# Patient Record
Sex: MALE | Race: BLACK OR AFRICAN AMERICAN | Employment: FULL TIME | URBAN - METROPOLITAN AREA
[De-identification: names, ages, dates, MRNs, and addresses within clinical notes are randomized per-mention and may not be internally consistent; named-entity substitution may affect disease eponyms.]

---

## 2022-01-02 ENCOUNTER — HOSPITAL ENCOUNTER (EMERGENCY)
Age: 67
Discharge: HOME | End: 2022-01-02
Attending: EMERGENCY MEDICINE
Payer: MEDICARE

## 2022-01-02 ENCOUNTER — APPOINTMENT (OUTPATIENT)
Dept: GENERAL RADIOLOGY | Age: 67
End: 2022-01-02
Attending: EMERGENCY MEDICINE
Payer: MEDICARE

## 2022-01-02 VITALS
WEIGHT: 315 LBS | DIASTOLIC BLOOD PRESSURE: 90 MMHG | OXYGEN SATURATION: 98 % | BODY MASS INDEX: 47.74 KG/M2 | SYSTOLIC BLOOD PRESSURE: 156 MMHG | RESPIRATION RATE: 20 BRPM | HEIGHT: 68 IN | TEMPERATURE: 98 F | HEART RATE: 78 BPM

## 2022-01-02 DIAGNOSIS — J18.9 COMMUNITY ACQUIRED PNEUMONIA, UNSPECIFIED LATERALITY: Primary | ICD-10-CM

## 2022-01-02 LAB
COVID-19 RAPID TEST, COVR: NOT DETECTED
FLUAV AG NPH QL IA: NEGATIVE
FLUBV AG NPH QL IA: NEGATIVE
GLUCOSE BLD STRIP.AUTO-MCNC: 142 MG/DL (ref 65–100)
SERVICE CMNT-IMP: ABNORMAL
SOURCE, COVRS: NORMAL
SPECIMEN SOURCE: NORMAL

## 2022-01-02 PROCEDURE — 87804 INFLUENZA ASSAY W/OPTIC: CPT

## 2022-01-02 PROCEDURE — 82962 GLUCOSE BLOOD TEST: CPT

## 2022-01-02 PROCEDURE — 71046 X-RAY EXAM CHEST 2 VIEWS: CPT

## 2022-01-02 PROCEDURE — 99283 EMERGENCY DEPT VISIT LOW MDM: CPT

## 2022-01-02 PROCEDURE — 87635 SARS-COV-2 COVID-19 AMP PRB: CPT

## 2022-01-02 RX ORDER — CEFPODOXIME PROXETIL 100 MG/1
200 TABLET, FILM COATED ORAL 2 TIMES DAILY
Qty: 28 TABLET | Refills: 0 | Status: SHIPPED | OUTPATIENT
Start: 2022-01-02 | End: 2022-01-09

## 2022-01-02 RX ORDER — BENZONATATE 100 MG/1
100 CAPSULE ORAL
Qty: 21 CAPSULE | Refills: 0 | Status: SHIPPED | OUTPATIENT
Start: 2022-01-02 | End: 2022-01-09

## 2022-01-02 RX ORDER — DOXYCYCLINE HYCLATE 100 MG
100 TABLET ORAL 2 TIMES DAILY
Qty: 14 TABLET | Refills: 0 | Status: SHIPPED | OUTPATIENT
Start: 2022-01-02 | End: 2022-01-09

## 2022-01-03 NOTE — ED NOTES
I have reviewed discharge instructions with the patient. The patient verbalized understanding. Patient left ED via Discharge Method: ambulatory to Home with self. Opportunity for questions and clarification provided. Patient given 3 scripts. To continue your aftercare when you leave the hospital, you may receive an automated call from our care team to check in on how you are doing. This is a free service and part of our promise to provide the best care and service to meet your aftercare needs.  If you have questions, or wish to unsubscribe from this service please call [phone]. Thank you for Choosing our [facility] Emergency Department.

## 2022-01-03 NOTE — ED PROVIDER NOTES
71-year-old male presents with complaint of obesity, diabetes who presents with complaint of congestion, productive cough with yellow sputum over the past several days. Denies chest pain, hemoptysis, abdominal pain, lower extremity swelling or calf tenderness. Patient uncertain of any recent sick contacts. Rate symptoms as moderate. Denies fever, chills. The history is provided by the patient. No  was used. Cough  This is a new problem. The current episode started more than 2 days ago. The problem occurs constantly. The problem has not changed since onset. The cough is productive of sputum. There has been no fever. Associated symptoms include rhinorrhea. Pertinent negatives include no chest pain, no chills, no sweats, no weight loss, no ear congestion, no ear pain, no headaches, no sore throat, no myalgias, no shortness of breath, no wheezing, no nausea, no vomiting and no confusion. He has tried nothing for the symptoms. The treatment provided no relief. No past medical history on file. No past surgical history on file. No family history on file.     Social History     Socioeconomic History    Marital status: SINGLE     Spouse name: Not on file    Number of children: Not on file    Years of education: Not on file    Highest education level: Not on file   Occupational History    Not on file   Tobacco Use    Smoking status: Not on file    Smokeless tobacco: Not on file   Substance and Sexual Activity    Alcohol use: Not on file    Drug use: Not on file    Sexual activity: Not on file   Other Topics Concern    Not on file   Social History Narrative    Not on file     Social Determinants of Health     Financial Resource Strain:     Difficulty of Paying Living Expenses: Not on file   Food Insecurity:     Worried About Running Out of Food in the Last Year: Not on file    Ly of Food in the Last Year: Not on file   Transportation Needs:     Lack of Transportation (Medical): Not on file    Lack of Transportation (Non-Medical): Not on file   Physical Activity:     Days of Exercise per Week: Not on file    Minutes of Exercise per Session: Not on file   Stress:     Feeling of Stress : Not on file   Social Connections:     Frequency of Communication with Friends and Family: Not on file    Frequency of Social Gatherings with Friends and Family: Not on file    Attends [employer] Services: Not on file    Active Member of [address] [place] [address] or Organizations: Not on file    Attends Club or Organization Meetings: Not on file    Marital Status: Not on file   Intimate Partner Violence:     Fear of Current or Ex-Partner: Not on file    Emotionally Abused: Not on file    Physically Abused: Not on file    Sexually Abused: Not on file   Housing Stability:     Unable to Pay for Housing in the Last Year: Not on file    Number of [place] in the Last Year: Not on file    Unstable Housing in the Last Year: Not on file         ALLERGIES: Sulfa (sulfonamide antibiotics)    Review of Systems   Constitutional: Negative for chills, fatigue, fever and weight loss. HENT: Positive for congestion and rhinorrhea. Negative for ear pain and sore throat. Respiratory: Positive for cough. Negative for shortness of breath and wheezing. Cardiovascular: Negative for chest pain. Gastrointestinal: Negative for abdominal pain, diarrhea, nausea and vomiting. Genitourinary: Negative for dysuria and flank pain. Musculoskeletal: Negative for arthralgias and myalgias. Skin: Negative for rash. Neurological: Negative for syncope, weakness, light-headedness and headaches. Hematological: Does not bruise/bleed easily. Psychiatric/Behavioral: Negative for confusion.        Vitals:    01/02/22 1829   BP: (!) 156/90   Pulse: 78   Resp: 20   Temp: 98 °F (36.7 °C)   SpO2: 98%   Weight: (!) 179.6 kg (396 lb)   Height: 5' 8\" (1.727 m)            Physical Exam  Vitals and nursing note reviewed. Constitutional:       Appearance: Normal appearance. He is obese. Comments: Well-appearing. HENT:      Head: Normocephalic. Right Ear: Tympanic membrane and ear canal normal.      Left Ear: Ear canal normal.      Nose: Nose normal.      Mouth/Throat:      Mouth: Mucous membranes are moist.   Eyes:      Conjunctiva/sclera: Conjunctivae normal.      Pupils: Pupils are equal, round, and reactive to light. Cardiovascular:      Rate and Rhythm: Normal rate. Pulses: Normal pulses. Heart sounds: Normal heart sounds. Pulmonary:      Effort: Pulmonary effort is normal.      Breath sounds: Normal breath sounds. Comments: CTAB. Abdominal:      General: Bowel sounds are normal.      Palpations: Abdomen is soft. Tenderness: There is no abdominal tenderness. There is no guarding or rebound. Comments: Soft, nontender, nondistended. No CVAT    Musculoskeletal:         General: No swelling. Normal range of motion. Right lower leg: No edema. Left lower leg: No edema. Comments: No calf tenderness. Skin:     Findings: No erythema or rash. Neurological:      General: No focal deficit present. Mental Status: He is alert and oriented to person, place, and time. Cranial Nerves: No cranial nerve deficit. Motor: No weakness. MDM  Number of Diagnoses or Management Options  Community acquired pneumonia, unspecified laterality: new and requires workup  Diagnosis management comments: Rapid Covid negative. Flu negative. Chest x-ray with pneumonia. Will discharge home with doxycycline, Vantin. Will discharge home with his own personal pulse oximeter. Patient given return precautions.        Amount and/or Complexity of Data Reviewed  Clinical lab tests: ordered and reviewed  Tests in the radiology section of CPT®: ordered and reviewed  Review and summarize past medical records: yes  Independent visualization of images, tracings, or specimens: yes    Risk of Complications, Morbidity, and/or Mortality  Presenting problems: moderate  Diagnostic procedures: low  Management options: low    Patient Progress  Patient progress: stable    ED Course as of 01/02/22 1927   Latisha Moore Jan 02, 2022 1926 CXR  IMPRESSION     Increased density overlying the lower thoracic spinal lateral view, without  definite correlate on the PA view. This may be an early pneumonia. Correlation  with patient's symptoms recommended.    [DF]      ED Course User Index  [DF] Chrissie Ward MD       Procedures               Torrey Ko MD; 1/2/2022 @8:02 PM Voice dictation software was used during the making of this note. This software is not perfect and grammatical and other typographical errors may be present.   This note has not been proofread for errors.  ===================================================================

## 2022-01-03 NOTE — DISCHARGE INSTRUCTIONS
Take antibiotics as prescribed. Schedule close follow-up primary care physician. Return to ED if symptoms worsen or progress in any way.

## 2022-05-13 ENCOUNTER — HOSPITAL ENCOUNTER (EMERGENCY)
Age: 67
Discharge: HOME | End: 2022-05-13
Attending: EMERGENCY MEDICINE
Payer: MEDICARE

## 2022-05-13 ENCOUNTER — APPOINTMENT (OUTPATIENT)
Dept: GENERAL RADIOLOGY | Age: 67
End: 2022-05-13
Attending: EMERGENCY MEDICINE
Payer: MEDICARE

## 2022-05-13 VITALS
TEMPERATURE: 98.2 F | OXYGEN SATURATION: 94 % | HEART RATE: 71 BPM | DIASTOLIC BLOOD PRESSURE: 81 MMHG | SYSTOLIC BLOOD PRESSURE: 149 MMHG | RESPIRATION RATE: 19 BRPM

## 2022-05-13 DIAGNOSIS — J18.9 COMMUNITY ACQUIRED PNEUMONIA, UNSPECIFIED LATERALITY: Primary | ICD-10-CM

## 2022-05-13 DIAGNOSIS — R05.9 COUGH: ICD-10-CM

## 2022-05-13 LAB
ALBUMIN SERPL-MCNC: 3.1 G/DL (ref 3.2–4.6)
ALBUMIN/GLOB SERPL: 0.6 {RATIO} (ref 1.2–3.5)
ALP SERPL-CCNC: 71 U/L (ref 50–136)
ALT SERPL-CCNC: 29 U/L (ref 12–65)
ANION GAP SERPL CALC-SCNC: 11 MMOL/L (ref 7–16)
AST SERPL-CCNC: 27 U/L (ref 15–37)
BASOPHILS # BLD: 0.1 K/UL (ref 0–0.2)
BASOPHILS NFR BLD: 1 % (ref 0–2)
BILIRUB SERPL-MCNC: 0.3 MG/DL (ref 0.2–1.1)
BUN SERPL-MCNC: 17 MG/DL (ref 8–23)
CALCIUM SERPL-MCNC: 8.7 MG/DL (ref 8.3–10.4)
CHLORIDE SERPL-SCNC: 107 MMOL/L (ref 98–107)
CO2 SERPL-SCNC: 23 MMOL/L (ref 21–32)
COVID-19 RAPID TEST, COVR: NOT DETECTED
CREAT SERPL-MCNC: 1.56 MG/DL (ref 0.8–1.5)
DIFFERENTIAL METHOD BLD: ABNORMAL
EOSINOPHIL # BLD: 0.2 K/UL (ref 0–0.8)
EOSINOPHIL NFR BLD: 3 % (ref 0.5–7.8)
ERYTHROCYTE [DISTWIDTH] IN BLOOD BY AUTOMATED COUNT: 17 % (ref 11.9–14.6)
FLUAV AG NPH QL IA: NEGATIVE
FLUBV AG NPH QL IA: NEGATIVE
GLOBULIN SER CALC-MCNC: 5.2 G/DL (ref 2.3–3.5)
GLUCOSE SERPL-MCNC: 92 MG/DL (ref 65–100)
HCT VFR BLD AUTO: 42.3 % (ref 41.1–50.3)
HGB BLD-MCNC: 13.3 G/DL (ref 13.6–17.2)
IMM GRANULOCYTES # BLD AUTO: 0 K/UL (ref 0–0.5)
IMM GRANULOCYTES NFR BLD AUTO: 0 % (ref 0–5)
LYMPHOCYTES # BLD: 2 K/UL (ref 0.5–4.6)
LYMPHOCYTES NFR BLD: 31 % (ref 13–44)
MCH RBC QN AUTO: 26.7 PG (ref 26.1–32.9)
MCHC RBC AUTO-ENTMCNC: 31.4 G/DL (ref 31.4–35)
MCV RBC AUTO: 84.8 FL (ref 79.6–97.8)
MONOCYTES # BLD: 0.9 K/UL (ref 0.1–1.3)
MONOCYTES NFR BLD: 14 % (ref 4–12)
NEUTS SEG # BLD: 3.3 K/UL (ref 1.7–8.2)
NEUTS SEG NFR BLD: 51 % (ref 43–78)
NRBC # BLD: 0 K/UL (ref 0–0.2)
PLATELET # BLD AUTO: 211 K/UL (ref 150–450)
PMV BLD AUTO: 9.5 FL (ref 9.4–12.3)
POTASSIUM SERPL-SCNC: 4.2 MMOL/L (ref 3.5–5.1)
PROT SERPL-MCNC: 8.3 G/DL (ref 6.3–8.2)
RBC # BLD AUTO: 4.99 M/UL (ref 4.23–5.6)
SODIUM SERPL-SCNC: 141 MMOL/L (ref 136–145)
SOURCE, COVRS: NORMAL
SPECIMEN SOURCE: NORMAL
WBC # BLD AUTO: 6.5 K/UL (ref 4.3–11.1)

## 2022-05-13 PROCEDURE — 96372 THER/PROPH/DIAG INJ SC/IM: CPT

## 2022-05-13 PROCEDURE — 99284 EMERGENCY DEPT VISIT MOD MDM: CPT

## 2022-05-13 PROCEDURE — 87804 INFLUENZA ASSAY W/OPTIC: CPT

## 2022-05-13 PROCEDURE — 74011000250 HC RX REV CODE- 250: Performed by: PHYSICIAN ASSISTANT

## 2022-05-13 PROCEDURE — 87635 SARS-COV-2 COVID-19 AMP PRB: CPT

## 2022-05-13 PROCEDURE — 74011250636 HC RX REV CODE- 250/636: Performed by: PHYSICIAN ASSISTANT

## 2022-05-13 PROCEDURE — 85025 COMPLETE CBC W/AUTO DIFF WBC: CPT

## 2022-05-13 PROCEDURE — 80053 COMPREHEN METABOLIC PANEL: CPT

## 2022-05-13 PROCEDURE — 73590 X-RAY EXAM OF LOWER LEG: CPT

## 2022-05-13 PROCEDURE — 71046 X-RAY EXAM CHEST 2 VIEWS: CPT

## 2022-05-13 RX ORDER — MODAFINIL 100 MG/1
100 TABLET ORAL DAILY
COMMUNITY
Start: 2022-03-09

## 2022-05-13 RX ORDER — ATORVASTATIN CALCIUM 80 MG/1
80 TABLET, FILM COATED ORAL DAILY
COMMUNITY
Start: 2022-04-01

## 2022-05-13 RX ORDER — LEVOFLOXACIN 750 MG/1
750 TABLET ORAL DAILY
Qty: 7 TABLET | Refills: 0 | Status: SHIPPED | OUTPATIENT
Start: 2022-05-13 | End: 2022-05-13 | Stop reason: SDUPTHER

## 2022-05-13 RX ORDER — LANOLIN ALCOHOL/MO/W.PET/CERES
325 CREAM (GRAM) TOPICAL DAILY
COMMUNITY
Start: 2022-04-01

## 2022-05-13 RX ORDER — FLUTICASONE PROPIONATE 50 MCG
2 SPRAY, SUSPENSION (ML) NASAL 2 TIMES DAILY
COMMUNITY
Start: 2022-04-01

## 2022-05-13 RX ORDER — TAMSULOSIN HYDROCHLORIDE 0.4 MG/1
0.8 CAPSULE ORAL DAILY
COMMUNITY
Start: 2022-04-01

## 2022-05-13 RX ORDER — DICLOFENAC SODIUM 10 MG/G
1-2 GEL TOPICAL 3 TIMES DAILY
COMMUNITY
Start: 2022-04-01

## 2022-05-13 RX ORDER — LEVOFLOXACIN 750 MG/1
750 TABLET ORAL DAILY
Qty: 7 TABLET | Refills: 0 | Status: SHIPPED | OUTPATIENT
Start: 2022-05-13 | End: 2022-05-20

## 2022-05-13 RX ORDER — METFORMIN HYDROCHLORIDE 500 MG/1
1000 TABLET, EXTENDED RELEASE ORAL 2 TIMES DAILY
COMMUNITY
Start: 2022-04-01

## 2022-05-13 RX ORDER — ALBUTEROL SULFATE 90 UG/1
2 AEROSOL, METERED RESPIRATORY (INHALATION)
COMMUNITY
Start: 2022-04-01

## 2022-05-13 RX ORDER — LANOLIN ALCOHOL/MO/W.PET/CERES
1000 CREAM (GRAM) TOPICAL DAILY
COMMUNITY
Start: 2022-04-01

## 2022-05-13 RX ORDER — ACETAMINOPHEN 500 MG
2000 TABLET ORAL DAILY
COMMUNITY
Start: 2022-04-01

## 2022-05-13 RX ORDER — ESOMEPRAZOLE MAGNESIUM 40 MG/1
40 CAPSULE, DELAYED RELEASE ORAL 2 TIMES DAILY
COMMUNITY
Start: 2022-04-01

## 2022-05-13 RX ORDER — HYDROXYZINE HYDROCHLORIDE 10 MG/1
10 TABLET, FILM COATED ORAL DAILY
COMMUNITY
Start: 2022-04-01

## 2022-05-13 RX ORDER — HYDROCORTISONE VALERATE 2 MG/G
CREAM TOPICAL 2 TIMES DAILY
COMMUNITY
Start: 2022-04-01

## 2022-05-13 RX ORDER — SACUBITRIL AND VALSARTAN 97; 103 MG/1; MG/1
1 TABLET, FILM COATED ORAL 2 TIMES DAILY
COMMUNITY
Start: 2022-04-01

## 2022-05-13 RX ADMIN — CEFTRIAXONE SODIUM 1000 MG: 500 INJECTION, POWDER, FOR SOLUTION INTRAMUSCULAR; INTRAVENOUS at 20:19

## 2022-05-13 NOTE — ED PROVIDER NOTES
Mask was worn during the entire patient examination. Mitul Aldrich is a 73 y.o. male who presents to the ED with a chief complaint of upper respiratory symptoms and right leg pain. Upper respiratory symptoms have been ongoing for over a week now. He has tried mucinex and other OTC agents with minimal relief. States the cough has slowly worsened. Denies chest pain, SOB, abdominal pain, fever, nausea, vomiting. Additionally has lateral right lower leg pain. Started several days ago. Atraumatic. Reports \"soreness\". No calf pain, numbness, or tingling. No history of DVT or PE. The history is provided by the patient. Cold Symptoms  This is a new problem. The current episode started more than 1 week ago. The problem occurs constantly. The problem has been gradually worsening. Pertinent negatives include no chest pain, no abdominal pain, no headaches and no shortness of breath. Nothing aggravates the symptoms. Nothing relieves the symptoms. He has tried nothing for the symptoms. Leg Pain  This is a new problem. The current episode started more than 2 days ago. The problem occurs constantly. The problem has not changed since onset. Pertinent negatives include no chest pain, no abdominal pain, no headaches and no shortness of breath. Nothing aggravates the symptoms. Nothing relieves the symptoms. He has tried nothing for the symptoms. No past medical history on file. No past surgical history on file. No family history on file.     Social History     Socioeconomic History    Marital status: SINGLE     Spouse name: Not on file    Number of children: Not on file    Years of education: Not on file    Highest education level: Not on file   Occupational History    Not on file   Tobacco Use    Smoking status: Not on file    Smokeless tobacco: Not on file   Substance and Sexual Activity    Alcohol use: Not on file    Drug use: Not on file    Sexual activity: Not on file   Other Topics Concern    Not on file   Social History Narrative    Not on file     Social Determinants of Health     Financial Resource Strain:     Difficulty of Paying Living Expenses: Not on file   Food Insecurity:     Worried About Running Out of Food in the Last Year: Not on file    Ly of Food in the Last Year: Not on file   Transportation Needs:     Lack of Transportation (Medical): Not on file    Lack of Transportation (Non-Medical): Not on file   Physical Activity:     Days of Exercise per Week: Not on file    Minutes of Exercise per Session: Not on file   Stress:     Feeling of Stress : Not on file   Social Connections:     Frequency of Communication with Friends and Family: Not on file    Frequency of Social Gatherings with Friends and Family: Not on file    Attends [employer] Services: Not on file    Active Member of [address] [place] [address] [place] or Organizations: Not on file    Attends Club or Organization Meetings: Not on file    Marital Status: Not on file   Intimate Partner Violence:     Fear of Current or Ex-Partner: Not on file    Emotionally Abused: Not on file    Physically Abused: Not on file    Sexually Abused: Not on file   Housing Stability:     Unable to Pay for Housing in the Last Year: Not on file    Number of [place] in the Last Year: Not on file    Unstable Housing in the Last Year: Not on file         ALLERGIES: Sulfa (sulfonamide antibiotics)    Review of Systems   Constitutional: Negative for chills and fever. HENT: Positive for congestion and rhinorrhea. Negative for ear pain, sinus pain and sore throat. Respiratory: Positive for cough. Negative for shortness of breath. Cardiovascular: Negative for chest pain and leg swelling. Gastrointestinal: Positive for diarrhea. Negative for abdominal pain, constipation, nausea and vomiting. Genitourinary: Negative for dysuria, frequency and urgency. Musculoskeletal: Positive for arthralgias and myalgias. Skin: Negative for rash.    Neurological: Negative for headaches. Psychiatric/Behavioral: Negative for agitation and behavioral problems. All other systems reviewed and are negative. There were no vitals filed for this visit. Physical Exam  Vitals and nursing note reviewed. Constitutional:       General: He is not in acute distress. Appearance: Normal appearance. He is obese. He is not ill-appearing. HENT:      Head: Normocephalic and atraumatic. Right Ear: External ear normal.      Left Ear: External ear normal.      Nose: Nose normal.      Mouth/Throat:      Mouth: Mucous membranes are dry. Pharynx: Oropharynx is clear. Eyes:      Extraocular Movements: Extraocular movements intact. Conjunctiva/sclera: Conjunctivae normal.   Cardiovascular:      Rate and Rhythm: Normal rate and regular rhythm. Pulses: Normal pulses. Heart sounds: Normal heart sounds. Pulmonary:      Effort: Pulmonary effort is normal.      Breath sounds: Normal breath sounds. No wheezing, rhonchi or rales. Abdominal:      General: Abdomen is flat. Palpations: Abdomen is soft. Musculoskeletal:         General: Tenderness (lateral aspect of right lower leg; calf nontender, no bony tenderness ) present. No swelling. Normal range of motion. Cervical back: Normal range of motion. Skin:     General: Skin is warm and dry. Capillary Refill: Capillary refill takes less than 2 seconds. Neurological:      General: No focal deficit present. Mental Status: He is alert and oriented to person, place, and time. Psychiatric:         Mood and Affect: Mood normal.         Behavior: Behavior normal.          MDM  Number of Diagnoses or Management Options  Community acquired pneumonia, unspecified laterality  Cough  Diagnosis management comments: Mr. Coolidge Hamman is a 56-year-old male with history of sleep apnea, type 2 diabetes, and Crohn's disease who presents with upper respiratory symptoms for over a week.   On exam patient is well-appearing in no acute distress. Vital signs are stable. Patient's room air oxygen level is 92%. Physical exam is grossly unremarkable including focal lung evaluation. Patient was ambulated around the department with no drop in oxygen saturation level on room air remaining at 92 to 93%. Labs are grossly unremarkable. Chest x-ray does demonstrate concern for bilateral infiltrates. Patient was given a gram of Rocephin here in the department. Patient will be discharged home on Levaquin for the next week. This patient was discussed with Dr. Lenin Devries my ER attending here today and he agrees that the patient does not need to be admitted here at this time. We did however discussed return protocols with the patient that he reports understanding. Patient will return if he meets any of those criteria for further evaluation and management. Patient reports understanding of the plan of care and is agreeable to proceed as discussed. Patient ambulatory upon discharge in stable condition. Voice dictation software was used during the making of this note. This software is not perfect and grammatical and other typographical errors may be present. This note has been proofread, but may still contain errors. YRN Kendrick; 5/13/2022 @8:31 PM   ===================================================================         Amount and/or Complexity of Data Reviewed  Review and summarize past medical records: yes  Discuss the patient with other providers: yes (Dr. Lenin Devries (ED Attending))  Independent visualization of images, tracings, or specimens: yes    Risk of Complications, Morbidity, and/or Mortality  Presenting problems: moderate  Diagnostic procedures: moderate  Management options: moderate  General comments: XR TIB/FIB RT   Final Result    Mild soft tissue edema. No acute osteoarticular process.      XR CHEST PA LAT   Final Result    Coarse reticular opacities throughout both lung fields suggesting interstitial edema or infiltrates. The patient was observed in the ED.     Results Reviewed:      Recent Results (from the past 24 hour(s))  -COVID-19 RAPID TEST:   Collection Time: 05/13/22  5:16 PM       Result                      Value             Ref Range           Specimen source             NASAL                                 COVID-19 rapid test         Not detected      NOTD           -INFLUENZA A & B AG (RAPID TEST):   Collection Time: 05/13/22  5:16 PM       Result                      Value             Ref Range           Influenza A Ag              Negative          NEG                 Influenza B Ag              Negative          NEG                 Source                                                        Nasopharyngeal  -CBC WITH AUTOMATED DIFF:   Collection Time: 05/13/22  6:53 PM       Result                      Value             Ref Range           WBC                         6.5               4.3 - 11.1 K*       RBC                         4.99              4.23 - 5.6 M*       HGB                         13.3 (L)          13.6 - 17.2 *       HCT                         42.3              41.1 - 50.3 %       MCV                         84.8              79.6 - 97.8 *       MCH                         26.7              26.1 - 32.9 *       MCHC                        31.4              31.4 - 35.0 *       RDW                         17.0 (H)          11.9 - 14.6 %       PLATELET                    211               150 - 450 K/*       MPV                         9.5               9.4 - 12.3 FL       ABSOLUTE NRBC               0.00              0.0 - 0.2 K/*       DF                          AUTOMATED                             NEUTROPHILS                 51                43 - 78 %           LYMPHOCYTES                 31                13 - 44 %           MONOCYTES                   14 (H)            4.0 - 12.0 %        EOSINOPHILS                 3                 0.5 - 7.8 %         BASOPHILS 1                 0.0 - 2.0 %         IMMATURE GRANULOCYTES       0                 0.0 - 5.0 %         ABS. NEUTROPHILS            3.3               1.7 - 8.2 K/*       ABS. LYMPHOCYTES            2.0               0.5 - 4.6 K/*       ABS. MONOCYTES              0.9               0.1 - 1.3 K/*       ABS. EOSINOPHILS            0.2               0.0 - 0.8 K/*       ABS. BASOPHILS              0.1               0.0 - 0.2 K/*       ABS. IMM. GRANS.            0.0               0.0 - 0.5 K/*  -METABOLIC PANEL, COMPREHENSIVE:   Collection Time: 05/13/22  6:53 PM       Result                      Value             Ref Range           Sodium                      141               136 - 145 mm*       Potassium                   4.2               3.5 - 5.1 mm*       Chloride                    107               98 - 107 mmo*       CO2                         23                21 - 32 mmol*       Anion gap                   11                7 - 16 mmol/L       Glucose                     92                65 - 100 mg/*       BUN                         17                8 - 23 MG/DL        Creatinine                  1.56 (H)          0.8 - 1.5 MG*       GFR est AA                  58 (L)            >60 ml/min/1*       GFR est non-AA              48 (L)            >60 ml/min/1*       Calcium                     8.7               8.3 - 10.4 M*       Bilirubin, total            0.3               0.2 - 1.1 MG*       ALT (SGPT)                  29                12 - 65 U/L         AST (SGOT)                  27                15 - 37 U/L         Alk.  phosphatase            71                50 - 136 U/L        Protein, total              8.3 (H)           6.3 - 8.2 g/*       Albumin                     3.1 (L)           3.2 - 4.6 g/*       Globulin                    5.2 (H)           2.3 - 3.5 g/*       A-G Ratio                   0.6 (L)           1.2 - 3.5        I discussed the results of all labs, procedures, radiographs, and treatments with the patient and available family. Treatment plan is agreed upon and the patient is ready for discharge. All voiced understanding of the discharge plan and medication instructions or changes as appropriate. Questions about treatment in the ED were answered. All were encouraged to return should symptoms worsen or new problems develop.       Patient Progress  Patient progress: stable         Procedures

## 2022-05-13 NOTE — ED NOTES
Pt O2 sat 91-93% on room air and heart rate 113 during ambulation, pt denies shortness of breath or chest pain.  Pt O2 sat 92-95% on room air at rest.

## 2022-05-14 NOTE — DISCHARGE INSTRUCTIONS
You were given an antibiotic injection while in the department. Take the prescribed antibiotics directly as written. Continue using your albuterol inhaler every 4-6 hours. Over-the-counter agents for symptom relief as necessary. Monitor for new or worsening symptoms such as chest pain, shortness of breath, fevers, or worsening cough and return to the emergency department to be evaluated if this occurs.

## 2022-05-14 NOTE — ED NOTES
I have reviewed discharge instructions with the patient. The patient verbalized understanding. Patient left ED via Discharge Method: ambulatory to Home with self. Opportunity for questions and clarification provided. Patient given 1 scripts. To continue your aftercare when you leave the hospital, you may receive an automated call from our care team to check in on how you are doing. This is a free service and part of our promise to provide the best care and service to meet your aftercare needs.  If you have questions, or wish to unsubscribe from this service please call [phone]. Thank you for Choosing our [facility] Emergency Department.

## 2022-08-19 ENCOUNTER — HOSPITAL ENCOUNTER (EMERGENCY)
Age: 67
Discharge: HOME | End: 2022-08-20
Attending: EMERGENCY MEDICINE
Payer: MEDICARE

## 2022-08-19 DIAGNOSIS — K50.111 CROHN'S DISEASE OF COLON WITH RECTAL BLEEDING (HCC): Primary | ICD-10-CM

## 2022-08-19 PROCEDURE — 99285 EMERGENCY DEPT VISIT HI MDM: CPT

## 2022-08-19 RX ORDER — HYDROCHLOROTHIAZIDE 12.5 MG/1
12.5 CAPSULE, GELATIN COATED ORAL DAILY
COMMUNITY
Start: 2022-06-02

## 2022-08-19 RX ORDER — ATORVASTATIN CALCIUM 80 MG/1
80 TABLET, FILM COATED ORAL DAILY
COMMUNITY
Start: 2022-04-01

## 2022-08-19 RX ORDER — METFORMIN HYDROCHLORIDE 500 MG/1
1000 TABLET, EXTENDED RELEASE ORAL 2 TIMES DAILY
COMMUNITY
Start: 2022-04-01

## 2022-08-19 RX ORDER — SACUBITRIL AND VALSARTAN 97; 103 MG/1; MG/1
1 TABLET, FILM COATED ORAL 2 TIMES DAILY
COMMUNITY
Start: 2022-04-01

## 2022-08-19 RX ORDER — ESOMEPRAZOLE MAGNESIUM 40 MG/1
40 CAPSULE, DELAYED RELEASE ORAL 2 TIMES DAILY
COMMUNITY
Start: 2022-04-01

## 2022-08-19 RX ORDER — MODAFINIL 100 MG/1
100 TABLET ORAL DAILY
COMMUNITY
Start: 2022-03-09

## 2022-08-19 RX ORDER — TAMSULOSIN HYDROCHLORIDE 0.4 MG/1
0.8 CAPSULE ORAL DAILY
COMMUNITY
Start: 2022-04-01

## 2022-08-19 RX ORDER — FERROUS SULFATE 325(65) MG
325 TABLET ORAL DAILY
COMMUNITY
Start: 2022-04-01

## 2022-08-19 RX ORDER — ALBUTEROL SULFATE 90 UG/1
2 AEROSOL, METERED RESPIRATORY (INHALATION) EVERY 6 HOURS PRN
COMMUNITY
Start: 2022-04-01

## 2022-08-19 ASSESSMENT — PAIN SCALES - GENERAL: PAINLEVEL_OUTOF10: 3

## 2022-08-19 ASSESSMENT — PAIN - FUNCTIONAL ASSESSMENT: PAIN_FUNCTIONAL_ASSESSMENT: 0-10

## 2022-08-20 ENCOUNTER — HOSPITAL ENCOUNTER (EMERGENCY)
Dept: CT IMAGING | Age: 67
Discharge: HOME | End: 2022-08-23
Payer: MEDICARE

## 2022-08-20 VITALS
DIASTOLIC BLOOD PRESSURE: 80 MMHG | HEART RATE: 77 BPM | RESPIRATION RATE: 18 BRPM | SYSTOLIC BLOOD PRESSURE: 157 MMHG | OXYGEN SATURATION: 96 % | TEMPERATURE: 98.5 F

## 2022-08-20 LAB
ALBUMIN SERPL-MCNC: 3.1 G/DL (ref 3.2–4.6)
ALBUMIN/GLOB SERPL: 0.6 {RATIO} (ref 1.2–3.5)
ALP SERPL-CCNC: 88 U/L (ref 50–136)
ALT SERPL-CCNC: 22 U/L (ref 12–65)
ANION GAP SERPL CALC-SCNC: 9 MMOL/L (ref 7–16)
AST SERPL-CCNC: 25 U/L (ref 15–37)
BASOPHILS # BLD: 0.1 K/UL (ref 0–0.2)
BASOPHILS NFR BLD: 1 % (ref 0–2)
BILIRUB SERPL-MCNC: 0.4 MG/DL (ref 0.2–1.1)
BUN SERPL-MCNC: 13 MG/DL (ref 8–23)
CALCIUM SERPL-MCNC: 8.3 MG/DL (ref 8.3–10.4)
CHLORIDE SERPL-SCNC: 109 MMOL/L (ref 98–107)
CO2 SERPL-SCNC: 21 MMOL/L (ref 21–32)
CREAT SERPL-MCNC: 1.24 MG/DL (ref 0.8–1.5)
CRP SERPL-MCNC: 2.2 MG/DL (ref 0–0.9)
DIFFERENTIAL METHOD BLD: ABNORMAL
EOSINOPHIL # BLD: 0.2 K/UL (ref 0–0.8)
EOSINOPHIL NFR BLD: 3 % (ref 0.5–7.8)
ERYTHROCYTE [DISTWIDTH] IN BLOOD BY AUTOMATED COUNT: 18.1 % (ref 11.9–14.6)
ERYTHROCYTE [SEDIMENTATION RATE] IN BLOOD: 34 MM/HR (ref 0–15)
GLOBULIN SER CALC-MCNC: 5.4 G/DL (ref 2.3–3.5)
GLUCOSE SERPL-MCNC: 87 MG/DL (ref 65–100)
HCT VFR BLD AUTO: 42 % (ref 41.1–50.3)
HGB BLD-MCNC: 13.1 G/DL (ref 13.6–17.2)
IMM GRANULOCYTES # BLD AUTO: 0 K/UL (ref 0–0.5)
IMM GRANULOCYTES NFR BLD AUTO: 0 % (ref 0–5)
INR PPP: 1.1
LYMPHOCYTES # BLD: 3.3 K/UL (ref 0.5–4.6)
LYMPHOCYTES NFR BLD: 36 % (ref 13–44)
MCH RBC QN AUTO: 27 PG (ref 26.1–32.9)
MCHC RBC AUTO-ENTMCNC: 31.2 G/DL (ref 31.4–35)
MCV RBC AUTO: 86.6 FL (ref 79.6–97.8)
MONOCYTES # BLD: 0.8 K/UL (ref 0.1–1.3)
MONOCYTES NFR BLD: 9 % (ref 4–12)
NEUTS SEG # BLD: 4.8 K/UL (ref 1.7–8.2)
NEUTS SEG NFR BLD: 52 % (ref 43–78)
NRBC # BLD: 0 K/UL (ref 0–0.2)
PLATELET # BLD AUTO: 153 K/UL (ref 150–450)
PMV BLD AUTO: 10.1 FL (ref 9.4–12.3)
POTASSIUM SERPL-SCNC: 4 MMOL/L (ref 3.5–5.1)
PROT SERPL-MCNC: 8.5 G/DL (ref 6.3–8.2)
PROTHROMBIN TIME: 14.7 SEC (ref 12.6–14.5)
RBC # BLD AUTO: 4.85 M/UL (ref 4.23–5.6)
SODIUM SERPL-SCNC: 139 MMOL/L (ref 136–145)
WBC # BLD AUTO: 9.2 K/UL (ref 4.3–11.1)

## 2022-08-20 PROCEDURE — 85652 RBC SED RATE AUTOMATED: CPT

## 2022-08-20 PROCEDURE — 6360000004 HC RX CONTRAST MEDICATION: Performed by: EMERGENCY MEDICINE

## 2022-08-20 PROCEDURE — 80053 COMPREHEN METABOLIC PANEL: CPT

## 2022-08-20 PROCEDURE — 2580000003 HC RX 258: Performed by: EMERGENCY MEDICINE

## 2022-08-20 PROCEDURE — 85025 COMPLETE CBC W/AUTO DIFF WBC: CPT

## 2022-08-20 PROCEDURE — 74177 CT ABD & PELVIS W/CONTRAST: CPT

## 2022-08-20 PROCEDURE — 85610 PROTHROMBIN TIME: CPT

## 2022-08-20 PROCEDURE — 86140 C-REACTIVE PROTEIN: CPT

## 2022-08-20 RX ORDER — HYOSCYAMINE SULFATE 0.12 MG/1
1 TABLET SUBLINGUAL 3 TIMES DAILY PRN
Qty: 20 EACH | Refills: 0 | Status: SHIPPED | OUTPATIENT
Start: 2022-08-20

## 2022-08-20 RX ORDER — 0.9 % SODIUM CHLORIDE 0.9 %
100 INTRAVENOUS SOLUTION INTRAVENOUS
Status: COMPLETED | OUTPATIENT
Start: 2022-08-20 | End: 2022-08-20

## 2022-08-20 RX ORDER — SODIUM CHLORIDE 0.9 % (FLUSH) 0.9 %
10 SYRINGE (ML) INJECTION
Status: COMPLETED | OUTPATIENT
Start: 2022-08-20 | End: 2022-08-20

## 2022-08-20 RX ADMIN — SODIUM CHLORIDE, PRESERVATIVE FREE 10 ML: 5 INJECTION INTRAVENOUS at 02:26

## 2022-08-20 RX ADMIN — SODIUM CHLORIDE 100 ML: 9 INJECTION, SOLUTION INTRAVENOUS at 02:26

## 2022-08-20 RX ADMIN — IOPAMIDOL 100 ML: 755 INJECTION, SOLUTION INTRAVENOUS at 02:25

## 2022-08-20 ASSESSMENT — ENCOUNTER SYMPTOMS
DIARRHEA: 0
COUGH: 0
SHORTNESS OF BREATH: 0
BLOOD IN STOOL: 1
FACIAL SWELLING: 0
ABDOMINAL PAIN: 1
VOMITING: 0

## 2022-08-20 NOTE — ED TRIAGE NOTES
Patient presents with dark stools. Reports ongoing x 1 week. History of ulcerative colitis. Takes iron supplement.

## 2022-08-20 NOTE — ED PROVIDER NOTES
[employer] Emergency Department Provider Note                   PCP:                None Provider               Age: 78 y.o. Sex: male       ICD-10-CM    1. Crohn's disease of colon with rectal bleeding ([facility] [address]  K50.111           DISPOSITION Decision To Discharge 08/20/2022 02:49:40 AM        MDM  Number of Diagnoses or Management Options  Crohn's disease of colon with rectal bleeding ([facility] [address]  Diagnosis management comments: I wore appropriate PPE throughout this patient's ED visit. Elie Baer MD, 12:05 AM      CT and labs ordered    12:42 AM  Stable Hb    2:51 AM  CT shows mild descending colonic wall thickening. No evidence of abscess or fistula. Very low suspicion for ischemia or significant hemorrhage due to normal hemoglobin and vitals. No bright red blood per rectum. Advise close follow-up with GI. Given prescription for Levsin. Amount and/or Complexity of Data Reviewed  Clinical lab tests: ordered and reviewed  Tests in the radiology section of CPT®: ordered and reviewed  Tests in the medicine section of CPT®: ordered and reviewed  Review and summarize past medical records: yes  Independent visualization of images, tracings, or specimens: yes         Orders Placed This Encounter   Procedures    CT ABDOMEN PELVIS W IV CONTRAST Additional Contrast? None    CBC with Auto Differential    CMP    C-Reactive Protein    Sedimentation Rate    Protime-INR    Cardiac Monitor - ED Only    Pulse Oximetry    Saline lock IV        Whit Yoder is a 78 y.o. male who presents to the Emergency Department with chief complaint of    Chief Complaint   Patient presents with    Melena     Patient presents with dark stools. Reports ongoing x 1 week. History of ulcerative colitis. Takes iron supplement.        52-year-old male with a history of  T2DM, HFmrEF, HTN, Crohn's, anemia (Fe, B12, AoCD), EDGARDO, morbid obesity without any relevant surgical history, former smoker, no alcohol use presents with 1 week of times daily    TAMSULOSIN (FLOMAX) 0.4 MG CAPSULE    Take 0.8 mg by mouth daily        Vitals signs and nursing note reviewed. Patient Vitals for the past 4 hrs:   Temp Pulse Resp BP SpO2   08/19/22 2351 98.5 °F (36.9 °C) 85 18 (!) 151/83 96 %          Physical Exam  Vitals and nursing note reviewed. Constitutional:       Appearance: Normal appearance. HENT:      Head: Normocephalic and atraumatic. Nose: Nose normal.      Mouth/Throat:      Mouth: Mucous membranes are moist.   Eyes:      Extraocular Movements: Extraocular movements intact. Pupils: Pupils are equal, round, and reactive to light. Cardiovascular:      Rate and Rhythm: Normal rate and regular rhythm. Heart sounds: Normal heart sounds. Pulmonary:      Effort: Pulmonary effort is normal. No respiratory distress. Breath sounds: Normal breath sounds. Abdominal:      General: Abdomen is flat. There is no distension. Palpations: Abdomen is soft. Tenderness: There is abdominal tenderness. Musculoskeletal:         General: No deformity. Normal range of motion. Cervical back: Normal range of motion and neck supple. Skin:     General: Skin is warm and dry. Neurological:      General: No focal deficit present. Mental Status: He is alert. Mental status is at baseline.    Psychiatric:         Mood and Affect: Mood normal.        Procedures      Labs Reviewed   CBC WITH AUTO DIFFERENTIAL - Abnormal; Notable for the following components:       Result Value    Hemoglobin 13.1 (*)     MCHC 31.2 (*)     RDW 18.1 (*)     All other components within normal limits   COMPREHENSIVE METABOLIC PANEL - Abnormal; Notable for the following components:    Chloride 109 (*)     Total Protein 8.5 (*)     Albumin 3.1 (*)     Globulin 5.4 (*)     Albumin/Globulin Ratio 0.6 (*)     All other components within normal limits   C-REACTIVE PROTEIN - Abnormal; Notable for the following components:    CRP 2.2 (*)     All other components within normal limits   SEDIMENTATION RATE - Abnormal; Notable for the following components:    Sed Rate, Automated 34 (*)     All other components within normal limits   PROTIME-INR - Abnormal; Notable for the following components:    Protime 14.7 (*)     All other components within normal limits        CT ABDOMEN PELVIS W IV CONTRAST Additional Contrast? None   Final Result   The wall of the descending colon appears somewhat thickened. This may be due to   lack of distention or to the patient's known Crohn's disease. Hyperdensity noted in the mucosa of the proximal colon may be due to normal   enhancement. Differential diagnosis would include hemorrhage or ischemia. Ischemia is less likely as the SMA appears enhanced throughout its visualized   course. Please correlate clinically. The above findings were discussed with Dr. Berkley Garcia at 2:33 AM on 8/20/2022. She   verbalized her understanding. Other findings as above. Voice dictation software was used during the making of this note. This software is not perfect and grammatical and other typographical errors may be present. This note has not been completely proofread for errors.      Carolina Elise MD  08/20/22 522 Marie Barnhart MD  08/20/22 8023

## 2022-08-20 NOTE — ED NOTES
Pt takes an Iron supplement and states that he is having some black tarry stool.  Has an appt with the GI doc [facility]     Gianna Haas RN  08/20/22 1507

## 2022-08-20 NOTE — ED NOTES
I have reviewed discharge instructions with the patient. The patient verbalized understanding. Patient left ED via Discharge Method: ambulatory to Home with self). Opportunity for questions and clarification provided. Patient given 1 scripts. To continue your aftercare when you leave the hospital, you may receive an automated call from our care team to check in on how you are doing. This is a free service and part of our promise to provide the best care and service to meet your aftercare needs.  If you have questions, or wish to unsubscribe from this service please call [phone]. Thank you for Choosing our [facility] Emergency Department.        Gianna Haas RN  08/20/22 9527

## 2023-04-07 ENCOUNTER — APPOINTMENT (OUTPATIENT)
Dept: GENERAL RADIOLOGY | Age: 68
End: 2023-04-07
Payer: MEDICARE

## 2023-04-07 ENCOUNTER — HOSPITAL ENCOUNTER (EMERGENCY)
Age: 68
Discharge: HOME | End: 2023-04-08
Attending: EMERGENCY MEDICINE
Payer: MEDICARE

## 2023-04-07 DIAGNOSIS — R05.2 SUBACUTE COUGH: Primary | ICD-10-CM

## 2023-04-07 PROCEDURE — 71045 X-RAY EXAM CHEST 1 VIEW: CPT

## 2023-04-07 ASSESSMENT — ENCOUNTER SYMPTOMS
SHORTNESS OF BREATH: 0
RHINORRHEA: 0
CONSTIPATION: 0
SINUS CONGESTION: 0
VOMITING: 0
WHEEZING: 0
NAUSEA: 0
COLOR CHANGE: 0
COUGH: 1
SORE THROAT: 0
ABDOMINAL PAIN: 0
DIARRHEA: 0
BACK PAIN: 0

## 2023-04-07 ASSESSMENT — PAIN - FUNCTIONAL ASSESSMENT: PAIN_FUNCTIONAL_ASSESSMENT: NONE - DENIES PAIN

## 2023-04-07 ASSESSMENT — LIFESTYLE VARIABLES
HOW OFTEN DO YOU HAVE A DRINK CONTAINING ALCOHOL: NEVER
HOW MANY STANDARD DRINKS CONTAINING ALCOHOL DO YOU HAVE ON A TYPICAL DAY: PATIENT DOES NOT DRINK

## 2023-04-08 VITALS
DIASTOLIC BLOOD PRESSURE: 66 MMHG | TEMPERATURE: 97.6 F | HEART RATE: 79 BPM | BODY MASS INDEX: 47.74 KG/M2 | RESPIRATION RATE: 21 BRPM | SYSTOLIC BLOOD PRESSURE: 129 MMHG | OXYGEN SATURATION: 96 % | HEIGHT: 68 IN | WEIGHT: 315 LBS

## 2023-04-08 LAB
ALBUMIN SERPL-MCNC: 3.1 G/DL (ref 3.2–4.6)
ALBUMIN/GLOB SERPL: 0.6 (ref 0.4–1.6)
ALP SERPL-CCNC: 69 U/L (ref 50–136)
ALT SERPL-CCNC: 21 U/L (ref 12–65)
ANION GAP SERPL CALC-SCNC: 6 MMOL/L (ref 2–11)
AST SERPL-CCNC: 17 U/L (ref 15–37)
BASOPHILS # BLD: 0.1 K/UL (ref 0–0.2)
BASOPHILS NFR BLD: 0 % (ref 0–2)
BILIRUB SERPL-MCNC: 0.4 MG/DL (ref 0.2–1.1)
BUN SERPL-MCNC: 17 MG/DL (ref 8–23)
CALCIUM SERPL-MCNC: 8.9 MG/DL (ref 8.3–10.4)
CHLORIDE SERPL-SCNC: 101 MMOL/L (ref 101–110)
CO2 SERPL-SCNC: 29 MMOL/L (ref 21–32)
CREAT SERPL-MCNC: 1.38 MG/DL (ref 0.8–1.5)
DIFFERENTIAL METHOD BLD: ABNORMAL
EOSINOPHIL # BLD: 0.4 K/UL (ref 0–0.8)
EOSINOPHIL NFR BLD: 3 % (ref 0.5–7.8)
ERYTHROCYTE [DISTWIDTH] IN BLOOD BY AUTOMATED COUNT: 17.5 % (ref 11.9–14.6)
GLOBULIN SER CALC-MCNC: 5.3 G/DL (ref 2.8–4.5)
GLUCOSE SERPL-MCNC: 144 MG/DL (ref 65–100)
HCT VFR BLD AUTO: 37.4 % (ref 41.1–50.3)
HGB BLD-MCNC: 12 G/DL (ref 13.6–17.2)
IMM GRANULOCYTES # BLD AUTO: 0.1 K/UL (ref 0–0.5)
IMM GRANULOCYTES NFR BLD AUTO: 1 % (ref 0–5)
LYMPHOCYTES # BLD: 3.5 K/UL (ref 0.5–4.6)
LYMPHOCYTES NFR BLD: 28 % (ref 13–44)
MAGNESIUM SERPL-MCNC: 1.5 MG/DL (ref 1.8–2.4)
MCH RBC QN AUTO: 26.6 PG (ref 26.1–32.9)
MCHC RBC AUTO-ENTMCNC: 32.1 G/DL (ref 31.4–35)
MCV RBC AUTO: 82.9 FL (ref 82–102)
MONOCYTES # BLD: 0.8 K/UL (ref 0.1–1.3)
MONOCYTES NFR BLD: 6 % (ref 4–12)
NEUTS SEG # BLD: 7.6 K/UL (ref 1.7–8.2)
NEUTS SEG NFR BLD: 62 % (ref 43–78)
NRBC # BLD: 0 K/UL (ref 0–0.2)
NT PRO BNP: 13 PG/ML (ref 5–125)
PLATELET # BLD AUTO: 287 K/UL (ref 150–450)
PMV BLD AUTO: 8.9 FL (ref 9.4–12.3)
POTASSIUM SERPL-SCNC: 3.4 MMOL/L (ref 3.5–5.1)
PROT SERPL-MCNC: 8.4 G/DL (ref 6.3–8.2)
RBC # BLD AUTO: 4.51 M/UL (ref 4.23–5.6)
SODIUM SERPL-SCNC: 136 MMOL/L (ref 133–143)
WBC # BLD AUTO: 12.3 K/UL (ref 4.3–11.1)

## 2023-04-08 PROCEDURE — 85025 COMPLETE CBC W/AUTO DIFF WBC: CPT

## 2023-04-08 PROCEDURE — 83735 ASSAY OF MAGNESIUM: CPT

## 2023-04-08 PROCEDURE — 83880 ASSAY OF NATRIURETIC PEPTIDE: CPT

## 2023-04-08 PROCEDURE — 80053 COMPREHEN METABOLIC PANEL: CPT

## 2023-04-08 RX ORDER — BENZONATATE 100 MG/1
100 CAPSULE ORAL 3 TIMES DAILY PRN
Qty: 30 CAPSULE | Refills: 0 | Status: SHIPPED | OUTPATIENT
Start: 2023-04-08 | End: 2023-04-15

## 2023-04-08 NOTE — ED TRIAGE NOTES
Cough and sneeze x 1mo; tonight presents w/ headache and left ear pain, reports coughing hard enough to black out, SOB; bp 100/67 in triage

## 2023-04-08 NOTE — ED NOTES
I have reviewed discharge instructions with the patient. The patient verbalized understanding. Patient left ED via Discharge Method: ambulatory to Home with self    Opportunity for questions and clarification provided. Patient given 1 scripts. To continue your aftercare when you leave the hospital, you may receive an automated call from our care team to check in on how you are doing. This is a free service and part of our promise to provide the best care and service to meet your aftercare needs.  If you have questions, or wish to unsubscribe from this service please call [phone]. Thank you for Choosing our [address] [place] [address] Emergency Department.         Lisa De La Fuente RN  04/08/23 0632

## 2023-04-08 NOTE — ED PROVIDER NOTES
Nondescript  Severity:  Mild  Duration:  4 weeks  Timing:  Constant  Progression:  Unchanged  Chronicity:  New  Relieved by:  Nothing  Worsened by:  Nothing  Associated symptoms: no chest pain, no chills, no fever, no headaches, no myalgias, no rash, no rhinorrhea, no shortness of breath, no sinus congestion, no sore throat and no wheezing       Review of Systems   Constitutional:  Negative for chills and fever. HENT:  Negative for congestion, rhinorrhea and sore throat. Respiratory:  Positive for cough. Negative for shortness of breath and wheezing. Cardiovascular:  Positive for leg swelling. Negative for chest pain and palpitations. Gastrointestinal:  Negative for abdominal pain, constipation, diarrhea, nausea and vomiting. Genitourinary:  Negative for dysuria and hematuria. Musculoskeletal:  Negative for back pain, myalgias and neck pain. Skin:  Negative for color change and rash. Neurological:  Negative for numbness and headaches. All other systems reviewed and are negative. Physical Exam     Vitals signs and nursing note reviewed:  Patient Vitals for the past 4 hrs:   Temp Pulse Resp BP SpO2   04/07/23 2257 97.5 °F (36.4 °C) 84 18 100/67 96 %          Physical Exam  Vitals and nursing note reviewed. Constitutional:       Appearance: Normal appearance. HENT:      Head: Normocephalic and atraumatic. Cardiovascular:      Rate and Rhythm: Normal rate and regular rhythm. Pulmonary:      Effort: Pulmonary effort is normal. No respiratory distress. Breath sounds: Normal breath sounds. No wheezing. Abdominal:      General: Bowel sounds are normal.      Palpations: Abdomen is soft. Tenderness: There is no abdominal tenderness. Musculoskeletal:         General: No swelling. Normal range of motion. Cervical back: Normal range of motion. No tenderness. Skin:     General: Skin is warm and dry. Neurological:      Mental Status: He is alert.         Procedures

## 2023-05-22 ENCOUNTER — HOSPITAL ENCOUNTER (EMERGENCY)
Age: 68
Discharge: HOME | End: 2023-05-23
Attending: EMERGENCY MEDICINE
Payer: MEDICARE

## 2023-05-22 DIAGNOSIS — E11.65 TYPE 2 DIABETES MELLITUS WITH HYPERGLYCEMIA, WITHOUT LONG-TERM CURRENT USE OF INSULIN (HCC): ICD-10-CM

## 2023-05-22 DIAGNOSIS — E86.0 DEHYDRATION: ICD-10-CM

## 2023-05-22 DIAGNOSIS — E83.42 HYPOMAGNESEMIA: Primary | ICD-10-CM

## 2023-05-22 LAB
ALBUMIN SERPL-MCNC: 2.9 G/DL (ref 3.2–4.6)
ALBUMIN/GLOB SERPL: 0.6 (ref 0.4–1.6)
ALP SERPL-CCNC: 65 U/L (ref 50–136)
ALT SERPL-CCNC: 42 U/L (ref 12–65)
ANION GAP SERPL CALC-SCNC: 7 MMOL/L (ref 2–11)
AST SERPL-CCNC: 33 U/L (ref 15–37)
BASOPHILS # BLD: 0 K/UL (ref 0–0.2)
BASOPHILS NFR BLD: 0 % (ref 0–2)
BILIRUB SERPL-MCNC: 0.4 MG/DL (ref 0.2–1.1)
BUN SERPL-MCNC: 24 MG/DL (ref 8–23)
CALCIUM SERPL-MCNC: 8.8 MG/DL (ref 8.3–10.4)
CHLORIDE SERPL-SCNC: 107 MMOL/L (ref 101–110)
CO2 SERPL-SCNC: 23 MMOL/L (ref 21–32)
CREAT SERPL-MCNC: 1.39 MG/DL (ref 0.8–1.5)
DIFFERENTIAL METHOD BLD: ABNORMAL
EOSINOPHIL # BLD: 0 K/UL (ref 0–0.8)
EOSINOPHIL NFR BLD: 0 % (ref 0.5–7.8)
ERYTHROCYTE [DISTWIDTH] IN BLOOD BY AUTOMATED COUNT: 18.6 % (ref 11.9–14.6)
EST. AVERAGE GLUCOSE BLD GHB EST-MCNC: 157 MG/DL
GLOBULIN SER CALC-MCNC: 5.2 G/DL (ref 2.8–4.5)
GLUCOSE SERPL-MCNC: 230 MG/DL (ref 65–100)
HBA1C MFR BLD: 7.1 % (ref 4.8–5.6)
HCT VFR BLD AUTO: 39.8 % (ref 41.1–50.3)
HGB BLD-MCNC: 12.8 G/DL (ref 13.6–17.2)
IMM GRANULOCYTES # BLD AUTO: 0 K/UL (ref 0–0.5)
IMM GRANULOCYTES NFR BLD AUTO: 0 % (ref 0–5)
LIPASE SERPL-CCNC: 170 U/L (ref 73–393)
LYMPHOCYTES # BLD: 3.3 K/UL (ref 0.5–4.6)
LYMPHOCYTES NFR BLD: 23 % (ref 13–44)
MAGNESIUM SERPL-MCNC: 1.7 MG/DL (ref 1.8–2.4)
MCH RBC QN AUTO: 27.2 PG (ref 26.1–32.9)
MCHC RBC AUTO-ENTMCNC: 32.2 G/DL (ref 31.4–35)
MCV RBC AUTO: 84.5 FL (ref 82–102)
MONOCYTES # BLD: 0.8 K/UL (ref 0.1–1.3)
MONOCYTES NFR BLD: 6 % (ref 4–12)
NEUTS SEG # BLD: 9.9 K/UL (ref 1.7–8.2)
NEUTS SEG NFR BLD: 71 % (ref 43–78)
NRBC # BLD: 0 K/UL (ref 0–0.2)
PLATELET # BLD AUTO: 296 K/UL (ref 150–450)
PMV BLD AUTO: 9.6 FL (ref 9.4–12.3)
POTASSIUM SERPL-SCNC: 5.5 MMOL/L (ref 3.5–5.1)
PROT SERPL-MCNC: 8.1 G/DL (ref 6.3–8.2)
RBC # BLD AUTO: 4.71 M/UL (ref 4.23–5.6)
SODIUM SERPL-SCNC: 137 MMOL/L (ref 133–143)
WBC # BLD AUTO: 14.1 K/UL (ref 4.3–11.1)

## 2023-05-22 PROCEDURE — 80053 COMPREHEN METABOLIC PANEL: CPT

## 2023-05-22 PROCEDURE — 2580000003 HC RX 258: Performed by: EMERGENCY MEDICINE

## 2023-05-22 PROCEDURE — 99284 EMERGENCY DEPT VISIT MOD MDM: CPT

## 2023-05-22 PROCEDURE — 83735 ASSAY OF MAGNESIUM: CPT

## 2023-05-22 PROCEDURE — 83036 HEMOGLOBIN GLYCOSYLATED A1C: CPT

## 2023-05-22 PROCEDURE — 83690 ASSAY OF LIPASE: CPT

## 2023-05-22 PROCEDURE — 85025 COMPLETE CBC W/AUTO DIFF WBC: CPT

## 2023-05-22 RX ORDER — 0.9 % SODIUM CHLORIDE 0.9 %
500 INTRAVENOUS SOLUTION INTRAVENOUS
Status: COMPLETED | OUTPATIENT
Start: 2023-05-22 | End: 2023-05-23

## 2023-05-22 RX ORDER — MAGNESIUM SULFATE 1 G/100ML
1000 INJECTION INTRAVENOUS ONCE
Status: COMPLETED | OUTPATIENT
Start: 2023-05-22 | End: 2023-05-23

## 2023-05-22 RX ADMIN — SODIUM CHLORIDE 500 ML: 9 INJECTION, SOLUTION INTRAVENOUS at 23:37

## 2023-05-22 ASSESSMENT — PAIN DESCRIPTION - LOCATION: LOCATION: EYE

## 2023-05-22 ASSESSMENT — PAIN SCALES - GENERAL: PAINLEVEL_OUTOF10: 4

## 2023-05-23 VITALS
HEIGHT: 68 IN | BODY MASS INDEX: 57.47 KG/M2 | SYSTOLIC BLOOD PRESSURE: 114 MMHG | RESPIRATION RATE: 20 BRPM | HEART RATE: 79 BPM | DIASTOLIC BLOOD PRESSURE: 77 MMHG | OXYGEN SATURATION: 96 % | TEMPERATURE: 98.1 F

## 2023-05-23 PROCEDURE — 96365 THER/PROPH/DIAG IV INF INIT: CPT

## 2023-05-23 PROCEDURE — 96361 HYDRATE IV INFUSION ADD-ON: CPT

## 2023-05-23 PROCEDURE — 6360000002 HC RX W HCPCS: Performed by: EMERGENCY MEDICINE

## 2023-05-23 RX ADMIN — MAGNESIUM SULFATE HEPTAHYDRATE 1000 MG: 1 INJECTION, SOLUTION INTRAVENOUS at 00:02

## 2023-05-23 ASSESSMENT — ENCOUNTER SYMPTOMS
RHINORRHEA: 0
NAUSEA: 0
EYE DISCHARGE: 0
EYE REDNESS: 0
ABDOMINAL PAIN: 0
COLOR CHANGE: 0
WHEEZING: 0
EYE ITCHING: 0
COUGH: 0
VOMITING: 0
SHORTNESS OF BREATH: 0
CONSTIPATION: 0
FACIAL SWELLING: 0
EYE PAIN: 0
DIARRHEA: 0
PHOTOPHOBIA: 0
STRIDOR: 0

## 2023-05-23 ASSESSMENT — VISUAL ACUITY
OS: 20/40
OU: 20/40
OD: 0

## 2023-05-23 NOTE — ED NOTES
I have reviewed discharge instructions with the patient. The patient verbalized understanding. Patient left ED via Discharge Method: ambulatory to Home with self POV    Opportunity for questions and clarification provided. Patient given 0 scripts. To continue your aftercare when you leave the hospital, you may receive an automated call from our care team to check in on how you are doing. This is a free service and part of our promise to provide the best care and service to meet your aftercare needs.  If you have questions, or wish to unsubscribe from this service please call [phone]. Thank you for Choosing our [facility] Emergency Department.         Chuckie Noriega RN  05/23/23 7575

## 2023-05-23 NOTE — ED PROVIDER NOTES
Emergency Department Provider Note       PCP: On File Not (Inactive)   Age: 78 y.o. Sex: male     DISPOSITION Decision To Discharge 05/23/2023 01:38:21 AM       ICD-10-CM    1. Hypomagnesemia  E83.42       2. Dehydration  E86.0       3. Type 2 diabetes mellitus with hyperglycemia, without long-term current use of insulin (HCC)  E11.65           Medical Decision Making     Complexity of Problems Addressed:  1 chronic illness with potential for bodily harm. Data Reviewed and Analyzed:  Category 1:   I independently ordered and reviewed each unique test.       Category 3: Discussion of management or test interpretation. I reviewed the laboratory studies independently. Significant for an elevated BUN of 24, creatinine 1.39 which is chronic, potassium 5.5, glucose 230, magnesium 1.7. Patient appears to be dehydrated with low magnesium which is likely contributory to his cramping. Elevated sugars likely contributing to his intermittent blurry vision. Both improved with treatment with IV fluids and magnesium. Counseled on appropriate checking sugar. Recommend he follow-up with his PMD to get new prescription for glucometer, test strips, etc. in case his insurance requires preauthorization. Will discharge home with recommended follow-up with PMD as discussed. Clear return precautions discussed        Risk of Complications and/or Morbidity of Patient Management:  Shared medical decision making was utilized in creating the patients health plan today. Is this patient to be included in the SEP-1 core measure due to severe sepsis or septic shock? No Exclusion criteria - the patient is NOT to be included for SEP-1 Core Measure due to: Infection is not suspected      History      Cody Daugherty is a 78 y.o. male who presents to the Emergency Department with chief complaint of    Chief Complaint   Patient presents with    Eye Problem    Abdominal Cramping      Mr. Khanh Kraus is a 76-year-old gentleman with past

## 2023-05-23 NOTE — ED TRIAGE NOTES
Pt co left eye twitching for over a week. Pt reports blurred vision earlier last week but no blurred vision now.      Patient also co abdominal cramping, bilateral leg cramping

## 2024-06-23 ENCOUNTER — HOSPITAL ENCOUNTER (EMERGENCY)
Age: 69
Discharge: HOME | End: 2024-06-23
Attending: EMERGENCY MEDICINE
Payer: MEDICARE

## 2024-06-23 ENCOUNTER — APPOINTMENT (OUTPATIENT)
Dept: GENERAL RADIOLOGY | Age: 69
End: 2024-06-23
Payer: MEDICARE

## 2024-06-23 VITALS
SYSTOLIC BLOOD PRESSURE: 132 MMHG | BODY MASS INDEX: 47.74 KG/M2 | DIASTOLIC BLOOD PRESSURE: 77 MMHG | TEMPERATURE: 99.2 F | WEIGHT: 315 LBS | OXYGEN SATURATION: 93 % | RESPIRATION RATE: 18 BRPM | HEART RATE: 82 BPM | HEIGHT: 68 IN

## 2024-06-23 DIAGNOSIS — U07.1 COVID-19 VIRUS INFECTION: Primary | ICD-10-CM

## 2024-06-23 LAB
ANION GAP SERPL CALC-SCNC: 15 MMOL/L (ref 9–18)
BUN SERPL-MCNC: 9 MG/DL (ref 8–23)
CALCIUM SERPL-MCNC: 8.6 MG/DL (ref 8.8–10.2)
CHLORIDE SERPL-SCNC: 100 MMOL/L (ref 98–107)
CO2 SERPL-SCNC: 22 MMOL/L (ref 20–28)
CREAT SERPL-MCNC: 1.11 MG/DL (ref 0.8–1.3)
ERYTHROCYTE [DISTWIDTH] IN BLOOD BY AUTOMATED COUNT: 17.7 % (ref 11.9–14.6)
FLUAV RNA SPEC QL NAA+PROBE: NOT DETECTED
FLUBV RNA SPEC QL NAA+PROBE: NOT DETECTED
GLUCOSE SERPL-MCNC: 91 MG/DL (ref 70–99)
HCT VFR BLD AUTO: 39.5 % (ref 41.1–50.3)
HGB BLD-MCNC: 12.8 G/DL (ref 13.6–17.2)
LACTATE SERPL-SCNC: 1.2 MMOL/L (ref 0.5–2)
MCH RBC QN AUTO: 26.4 PG (ref 26.1–32.9)
MCHC RBC AUTO-ENTMCNC: 32.4 G/DL (ref 31.4–35)
MCV RBC AUTO: 81.4 FL (ref 82–102)
NRBC # BLD: 0 K/UL (ref 0–0.2)
PLATELET # BLD AUTO: 213 K/UL (ref 150–450)
PMV BLD AUTO: 8.9 FL (ref 9.4–12.3)
POTASSIUM SERPL-SCNC: 4 MMOL/L (ref 3.5–5.1)
RBC # BLD AUTO: 4.85 M/UL (ref 4.23–5.6)
SARS-COV-2 RDRP RESP QL NAA+PROBE: DETECTED
SODIUM SERPL-SCNC: 137 MMOL/L (ref 136–145)
SOURCE: ABNORMAL
WBC # BLD AUTO: 7.1 K/UL (ref 4.3–11.1)

## 2024-06-23 PROCEDURE — 94640 AIRWAY INHALATION TREATMENT: CPT

## 2024-06-23 PROCEDURE — 80048 BASIC METABOLIC PNL TOTAL CA: CPT

## 2024-06-23 PROCEDURE — 71046 X-RAY EXAM CHEST 2 VIEWS: CPT

## 2024-06-23 PROCEDURE — 87502 INFLUENZA DNA AMP PROBE: CPT

## 2024-06-23 PROCEDURE — 87040 BLOOD CULTURE FOR BACTERIA: CPT

## 2024-06-23 PROCEDURE — 6370000000 HC RX 637 (ALT 250 FOR IP): Performed by: EMERGENCY MEDICINE

## 2024-06-23 PROCEDURE — 94664 DEMO&/EVAL PT USE INHALER: CPT

## 2024-06-23 PROCEDURE — 2580000003 HC RX 258: Performed by: EMERGENCY MEDICINE

## 2024-06-23 PROCEDURE — 99284 EMERGENCY DEPT VISIT MOD MDM: CPT

## 2024-06-23 PROCEDURE — 83605 ASSAY OF LACTIC ACID: CPT

## 2024-06-23 PROCEDURE — 87635 SARS-COV-2 COVID-19 AMP PRB: CPT

## 2024-06-23 PROCEDURE — 85027 COMPLETE CBC AUTOMATED: CPT

## 2024-06-23 RX ORDER — SODIUM CHLORIDE, SODIUM LACTATE, POTASSIUM CHLORIDE, AND CALCIUM CHLORIDE .6; .31; .03; .02 G/100ML; G/100ML; G/100ML; G/100ML
500 INJECTION, SOLUTION INTRAVENOUS
Status: COMPLETED | OUTPATIENT
Start: 2024-06-23 | End: 2024-06-23

## 2024-06-23 RX ORDER — ACETAMINOPHEN 325 MG/1
650 TABLET ORAL
Status: COMPLETED | OUTPATIENT
Start: 2024-06-23 | End: 2024-06-23

## 2024-06-23 RX ORDER — IPRATROPIUM BROMIDE AND ALBUTEROL SULFATE 2.5; .5 MG/3ML; MG/3ML
1 SOLUTION RESPIRATORY (INHALATION)
Status: COMPLETED | OUTPATIENT
Start: 2024-06-23 | End: 2024-06-23

## 2024-06-23 RX ADMIN — IPRATROPIUM BROMIDE AND ALBUTEROL SULFATE 1 DOSE: .5; 3 SOLUTION RESPIRATORY (INHALATION) at 20:25

## 2024-06-23 RX ADMIN — ACETAMINOPHEN 650 MG: 325 TABLET, FILM COATED ORAL at 20:48

## 2024-06-23 RX ADMIN — SODIUM CHLORIDE, POTASSIUM CHLORIDE, SODIUM LACTATE AND CALCIUM CHLORIDE 500 ML: 600; 310; 30; 20 INJECTION, SOLUTION INTRAVENOUS at 20:15

## 2024-06-23 ASSESSMENT — PAIN DESCRIPTION - LOCATION
LOCATION: GENERALIZED
LOCATION: GENERALIZED

## 2024-06-23 ASSESSMENT — PAIN SCALES - GENERAL
PAINLEVEL_OUTOF10: 5
PAINLEVEL_OUTOF10: 5

## 2024-06-23 ASSESSMENT — PAIN DESCRIPTION - DESCRIPTORS: DESCRIPTORS: ACHING

## 2024-06-23 ASSESSMENT — LIFESTYLE VARIABLES
HOW MANY STANDARD DRINKS CONTAINING ALCOHOL DO YOU HAVE ON A TYPICAL DAY: PATIENT DOES NOT DRINK
HOW OFTEN DO YOU HAVE A DRINK CONTAINING ALCOHOL: NEVER

## 2024-06-23 NOTE — ED PROVIDER NOTES
Emergency Department Provider Note       PCP: Not, On File (Inactive)   Age: 69 y.o.   Sex: male     DISPOSITION Decision To Discharge 06/23/2024 08:44:53 PM       ICD-10-CM    1. COVID-19 virus infection  U07.1           Medical Decision Making     Review of records reveals his stomach was infusion was Entyvio for Crohn's disease.  He is febrile but not tachycardic or hypotensive.  Oxygen saturations are borderline at 92%.  Will obtain blood cultures blood work and chest x-ray to evaluate for pneumonia.  COVID test ordered in triage.  Patient reports he is vaccinated for flu and COVID.  ED Course as of 06/23/24 2048   Sun Jun 23, 2024 2044 Oxygen saturations remain normal except when patient sleeping but he uses CPAP at night.  Patient has COVID but no pneumonia on chest x-ray.  Given his immunocompromise state I will start him on Paxlovid.  Illness onset 3 days ago [KM]      ED Course User Index  [KM] Giovanny Zendejas MD     1 or more acute illnesses that pose a threat to life or bodily function.   Over the counter drug management performed.  Prescription drug management performed.  Chronic medical problems impacting care include Crohn's disease, immunocompromised.  Shared medical decision making was utilized in creating the patients health plan today.    I independently ordered and reviewed each unique test.  I reviewed external records: provider visit note from PCP.  I reviewed external records: provider visit note from outside specialist.     I interpreted the X-rays chest x-ray shows no infiltrate or acute pathology-confirmed by radiology.        Exclusion criteria - the patient is NOT to be included for SEP-1 Core Measure due to: 2+ SIRS criteria are not met       History     69-year-old male presents with cough body aches and congestion since Friday.  He reports recently receiving an infusion for his stomach.  He denies chest pain or trouble breathing.  No vomiting or diarrhea.  No abdominal pain.  No

## 2024-06-24 NOTE — DISCHARGE INSTRUCTIONS
Paxlovid as prescribed.  Do not take your cholesterol medicine for 7 to 10 days.  Do not take it while on Paxlovid.  Increase fluids.  Tylenol and Motrin for aches pains and fevers.  NyQuil at night for cough and congestion.  Salt water nasal spray for nasal congestion.  Follow-up with your doctor in 7 to 10 days if not improving.  Return if severe trouble breathing.  Be sure to use your CPAP.

## 2024-06-24 NOTE — ED NOTES
Patient mobility status  with no difficulty. Provider aware     I have reviewed discharge instructions with the patient.  The patient verbalized understanding.    Patient left ED via Discharge Method: ambulatory to Home with  self .    Opportunity for questions and clarification provided.     Patient given 1 e scripts.

## 2024-06-28 LAB
BACTERIA SPEC CULT: NORMAL
BACTERIA SPEC CULT: NORMAL
SERVICE CMNT-IMP: NORMAL
SERVICE CMNT-IMP: NORMAL

## 2024-08-11 ENCOUNTER — APPOINTMENT (OUTPATIENT)
Dept: CT IMAGING | Age: 69
End: 2024-08-11
Payer: MEDICARE

## 2024-08-11 ENCOUNTER — APPOINTMENT (OUTPATIENT)
Dept: GENERAL RADIOLOGY | Age: 69
End: 2024-08-11
Payer: MEDICARE

## 2024-08-11 ENCOUNTER — HOSPITAL ENCOUNTER (INPATIENT)
Age: 69
LOS: 4 days | Discharge: HOME | End: 2024-08-15
Attending: EMERGENCY MEDICINE | Admitting: FAMILY MEDICINE
Payer: MEDICARE

## 2024-08-11 DIAGNOSIS — H92.01 RIGHT EAR PAIN: ICD-10-CM

## 2024-08-11 DIAGNOSIS — A41.9 SEPTICEMIA (HCC): Primary | ICD-10-CM

## 2024-08-11 DIAGNOSIS — I95.89 OTHER SPECIFIED HYPOTENSION: ICD-10-CM

## 2024-08-11 DIAGNOSIS — M25.461 EFFUSION OF RIGHT KNEE: ICD-10-CM

## 2024-08-11 DIAGNOSIS — G44.52 NEW DAILY PERSISTENT HEADACHE: ICD-10-CM

## 2024-08-11 PROBLEM — I95.9 HYPOTENSION: Status: ACTIVE | Noted: 2024-08-11

## 2024-08-11 PROBLEM — D72.829 LEUKOCYTOSIS: Status: ACTIVE | Noted: 2024-08-11

## 2024-08-11 PROBLEM — D64.9 NORMOCYTIC ANEMIA: Status: ACTIVE | Noted: 2024-08-11

## 2024-08-11 LAB
ALBUMIN SERPL-MCNC: 2.8 G/DL (ref 3.2–4.6)
ALBUMIN/GLOB SERPL: 0.6 (ref 1–1.9)
ALP SERPL-CCNC: 84 U/L (ref 40–129)
ALT SERPL-CCNC: 34 U/L (ref 12–65)
ANION GAP SERPL CALC-SCNC: 15 MMOL/L (ref 9–18)
APPEARANCE UR: CLEAR
AST SERPL-CCNC: 22 U/L (ref 15–37)
BACTERIA URNS QL MICRO: 0 /HPF
BASOPHILS # BLD: 0.1 K/UL (ref 0–0.2)
BASOPHILS NFR BLD: 1 % (ref 0–2)
BILIRUB SERPL-MCNC: 0.3 MG/DL (ref 0–1.2)
BILIRUB UR QL: NEGATIVE
BUN SERPL-MCNC: 10 MG/DL (ref 8–23)
CALCIUM SERPL-MCNC: 8.8 MG/DL (ref 8.8–10.2)
CASTS URNS QL MICRO: ABNORMAL /LPF
CHLORIDE SERPL-SCNC: 100 MMOL/L (ref 98–107)
CO2 SERPL-SCNC: 22 MMOL/L (ref 20–28)
COLOR UR: ABNORMAL
CREAT SERPL-MCNC: 1.1 MG/DL (ref 0.8–1.3)
DIFFERENTIAL METHOD BLD: ABNORMAL
EOSINOPHIL # BLD: 0.4 K/UL (ref 0–0.8)
EOSINOPHIL NFR BLD: 3 % (ref 0.5–7.8)
EPI CELLS #/AREA URNS HPF: ABNORMAL /HPF
ERYTHROCYTE [DISTWIDTH] IN BLOOD BY AUTOMATED COUNT: 18.2 % (ref 11.9–14.6)
ERYTHROCYTE [SEDIMENTATION RATE] IN BLOOD: 90 MM/HR (ref 0–15)
FLUAV RNA SPEC QL NAA+PROBE: NOT DETECTED
FLUBV RNA SPEC QL NAA+PROBE: NOT DETECTED
GLOBULIN SER CALC-MCNC: 4.6 G/DL (ref 2.3–3.5)
GLUCOSE SERPL-MCNC: 126 MG/DL (ref 70–99)
GLUCOSE UR STRIP.AUTO-MCNC: NEGATIVE MG/DL
HCT VFR BLD AUTO: 37.2 % (ref 41.1–50.3)
HGB BLD-MCNC: 12 G/DL (ref 13.6–17.2)
HGB UR QL STRIP: NEGATIVE
IMM GRANULOCYTES # BLD AUTO: 0.1 K/UL (ref 0–0.5)
IMM GRANULOCYTES NFR BLD AUTO: 0 % (ref 0–5)
KETONES UR QL STRIP.AUTO: ABNORMAL MG/DL
LACTATE SERPL-SCNC: 1.3 MMOL/L (ref 0.5–2)
LACTATE SERPL-SCNC: 2.8 MMOL/L (ref 0.5–2)
LEUKOCYTE ESTERASE UR QL STRIP.AUTO: NEGATIVE
LYMPHOCYTES # BLD: 3.5 K/UL (ref 0.5–4.6)
LYMPHOCYTES NFR BLD: 24 % (ref 13–44)
MCH RBC QN AUTO: 26.6 PG (ref 26.1–32.9)
MCHC RBC AUTO-ENTMCNC: 32.3 G/DL (ref 31.4–35)
MCV RBC AUTO: 82.5 FL (ref 82–102)
MONOCYTES # BLD: 1 K/UL (ref 0.1–1.3)
MONOCYTES NFR BLD: 6 % (ref 4–12)
NEUTS SEG # BLD: 9.9 K/UL (ref 1.7–8.2)
NEUTS SEG NFR BLD: 67 % (ref 43–78)
NITRITE UR QL STRIP.AUTO: NEGATIVE
NRBC # BLD: 0 K/UL (ref 0–0.2)
PH UR STRIP: 5.5 (ref 5–9)
PLATELET # BLD AUTO: 304 K/UL (ref 150–450)
PMV BLD AUTO: 8.8 FL (ref 9.4–12.3)
POTASSIUM SERPL-SCNC: 3.5 MMOL/L (ref 3.5–5.1)
PROCALCITONIN SERPL-MCNC: 0.06 NG/ML (ref 0–0.1)
PROT SERPL-MCNC: 7.4 G/DL (ref 6.3–8.2)
PROT UR STRIP-MCNC: 30 MG/DL
RBC # BLD AUTO: 4.51 M/UL (ref 4.23–5.6)
SARS-COV-2 RDRP RESP QL NAA+PROBE: NOT DETECTED
SODIUM SERPL-SCNC: 137 MMOL/L (ref 136–145)
SOURCE: NORMAL
SP GR UR REFRACTOMETRY: 1.02 (ref 1–1.02)
STREP, MOLECULAR: NOT DETECTED
UROBILINOGEN UR QL STRIP.AUTO: 0.2 EU/DL (ref 0.2–1)
WBC # BLD AUTO: 14.9 K/UL (ref 4.3–11.1)
WBC URNS QL MICRO: ABNORMAL /HPF

## 2024-08-11 PROCEDURE — 96366 THER/PROPH/DIAG IV INF ADDON: CPT

## 2024-08-11 PROCEDURE — 87651 STREP A DNA AMP PROBE: CPT

## 2024-08-11 PROCEDURE — 87070 CULTURE OTHR SPECIMN AEROBIC: CPT

## 2024-08-11 PROCEDURE — 1100000000 HC RM PRIVATE

## 2024-08-11 PROCEDURE — 87635 SARS-COV-2 COVID-19 AMP PRB: CPT

## 2024-08-11 PROCEDURE — 99285 EMERGENCY DEPT VISIT HI MDM: CPT

## 2024-08-11 PROCEDURE — 83605 ASSAY OF LACTIC ACID: CPT

## 2024-08-11 PROCEDURE — 71045 X-RAY EXAM CHEST 1 VIEW: CPT

## 2024-08-11 PROCEDURE — 2580000003 HC RX 258: Performed by: NURSE PRACTITIONER

## 2024-08-11 PROCEDURE — 89050 BODY FLUID CELL COUNT: CPT

## 2024-08-11 PROCEDURE — 85652 RBC SED RATE AUTOMATED: CPT

## 2024-08-11 PROCEDURE — 84145 PROCALCITONIN (PCT): CPT

## 2024-08-11 PROCEDURE — 87502 INFLUENZA DNA AMP PROBE: CPT

## 2024-08-11 PROCEDURE — 80053 COMPREHEN METABOLIC PANEL: CPT

## 2024-08-11 PROCEDURE — 6360000002 HC RX W HCPCS: Performed by: NURSE PRACTITIONER

## 2024-08-11 PROCEDURE — 96367 TX/PROPH/DG ADDL SEQ IV INF: CPT

## 2024-08-11 PROCEDURE — 87040 BLOOD CULTURE FOR BACTERIA: CPT

## 2024-08-11 PROCEDURE — 0S9C3ZZ DRAINAGE OF RIGHT KNEE JOINT, PERCUTANEOUS APPROACH: ICD-10-PCS | Performed by: FAMILY MEDICINE

## 2024-08-11 PROCEDURE — 87205 SMEAR GRAM STAIN: CPT

## 2024-08-11 PROCEDURE — 93005 ELECTROCARDIOGRAM TRACING: CPT | Performed by: NURSE PRACTITIONER

## 2024-08-11 PROCEDURE — 70450 CT HEAD/BRAIN W/O DYE: CPT

## 2024-08-11 PROCEDURE — 85025 COMPLETE CBC W/AUTO DIFF WBC: CPT

## 2024-08-11 PROCEDURE — 81001 URINALYSIS AUTO W/SCOPE: CPT

## 2024-08-11 PROCEDURE — 6370000000 HC RX 637 (ALT 250 FOR IP): Performed by: NURSE PRACTITIONER

## 2024-08-11 PROCEDURE — 73562 X-RAY EXAM OF KNEE 3: CPT

## 2024-08-11 PROCEDURE — 89060 EXAM SYNOVIAL FLUID CRYSTALS: CPT

## 2024-08-11 PROCEDURE — 96365 THER/PROPH/DIAG IV INF INIT: CPT

## 2024-08-11 RX ORDER — 0.9 % SODIUM CHLORIDE 0.9 %
30 INTRAVENOUS SOLUTION INTRAVENOUS ONCE
Status: COMPLETED | OUTPATIENT
Start: 2024-08-11 | End: 2024-08-11

## 2024-08-11 RX ORDER — LIDOCAINE HYDROCHLORIDE 10 MG/ML
INJECTION, SOLUTION INFILTRATION; PERINEURAL
Status: DISCONTINUED
Start: 2024-08-11 | End: 2024-08-12

## 2024-08-11 RX ORDER — ACETAMINOPHEN 500 MG
1000 TABLET ORAL
Status: COMPLETED | OUTPATIENT
Start: 2024-08-11 | End: 2024-08-11

## 2024-08-11 RX ADMIN — ACETAMINOPHEN 1000 MG: 500 TABLET, FILM COATED ORAL at 19:40

## 2024-08-11 RX ADMIN — PIPERACILLIN AND TAZOBACTAM 4500 MG: 4; .5 INJECTION, POWDER, FOR SOLUTION INTRAVENOUS at 19:45

## 2024-08-11 RX ADMIN — Medication 2500 MG: at 20:20

## 2024-08-11 RX ADMIN — SODIUM CHLORIDE 2052 ML: 9 INJECTION, SOLUTION INTRAVENOUS at 18:40

## 2024-08-11 ASSESSMENT — PAIN - FUNCTIONAL ASSESSMENT: PAIN_FUNCTIONAL_ASSESSMENT: 0-10

## 2024-08-11 ASSESSMENT — PAIN SCALES - GENERAL: PAINLEVEL_OUTOF10: 9

## 2024-08-11 NOTE — ED PROVIDER NOTES
Emergency Department Provider Note       PCP: Not, On File (Inactive)   Age: 69 y.o.   Sex: male     DISPOSITION    Admit    ICD-10-CM    1. Septicemia (HCC)  A41.9       2. Effusion of right knee  M25.461       3. Other specified hypotension  I95.89       4. New daily persistent headache  G44.52       5. Right ear pain  H92.01           Medical Decision Making     As HPI.  Initially evaluated the patient in the ED priya has a noticed at triage he was hypotensive and tachycardic.  Reports new persistent daily headache and right ear pain x 2 weeks, no visual change, dizziness lightheadedness, numbness tingling or weakness.  No worsening of headache.  No worsening of life.  No cough or other URI symptoms though he states he was recently treated for sinusitis by his PCP.  In chart review noted he was reporting right ear pain, headache, and right knee pain with PCP on encounter on 8/8, 8/1.  COVID in June of this year.  After my initial evaluation initiated broad-based workup with concern for sepsis though less suspicion at this time for severe sepsis or septic shock.  Will obtain labs and UA, chest x-ray and CT scan of the head.  Will obtain urinalysis, blood cultures, lactic acid and procalcitonin  Requested room and he was promptly placed in room.  Patient is resting comfortably.  His BP is improved as has his heart rate but still with a bit soft BP.  Patient is receiving 30 mL/KG IV fluid resuscitation at ideal body weight due to BMI greater than 44.  Will give IV antibiotics, will swab for strep COVID and flu.  CT of the head with no acute emergent process.  I have low clinical suspicion at this time for TIA, CVA, dissection, giant cell arteritis, mastoiditis, malignant otitis externa, acute otitis media, invasive infection, acute sinusitis, encephalitis, meningitis, strep pharyngitis, pneumonia, septic joint, cellulitis.  Discussed with ED attending who has evaluated the patient.  No obvious UTI.  Leukocytosis  CT scans performed at this facility use one or all of the following:  Automated exposure control, adjustment of the mA and/or kVp according to  patient's size, iterative reconstruction.    COMPARISON: None    FINDINGS:  No areas of abnormal attenuation are seen in the brain. There is no CT evidence  of acute hemorrhage or infarction. The ventricles are normal in size. There are  no extra-axial fluid collections. No masses are seen. The sinuses are clear.  There are no bony lesions.      Impression    No CT evidence of acute intracranial abnormality.    Electronically signed by Adeel Begum   XR KNEE RIGHT (3 VIEWS)    Narrative    Right Knee    INDICATION: Knee pain and swelling, concern for fracture, infectious process,  other    COMPARISON:  None    TECHNIQUE: Three views of the right knee were obtained    FINDINGS: No evidence of acute fracture. There is mild medial joint space  narrowing and small osteophytes. Questionable calcified loose bodies overlying  the medial joint space. Moderate to large joint effusion is suspected.      Impression    Suspected moderate to large joint effusion but no evidence of acute  fracture. Osteoarthritis and suggestion of tiny calcified loose bodies.      Electronically signed by Sam Kaur   Comprehensive Metabolic Panel   Result Value Ref Range    Sodium 137 136 - 145 mmol/L    Potassium 3.5 3.5 - 5.1 mmol/L    Chloride 100 98 - 107 mmol/L    CO2 22 20 - 28 mmol/L    Anion Gap 15 9 - 18 mmol/L    Glucose 126 (H) 70 - 99 mg/dL    BUN 10 8 - 23 MG/DL    Creatinine 1.10 0.80 - 1.30 MG/DL    Est, Glom Filt Rate 73 >60 ml/min/1.73m2    Calcium 8.8 8.8 - 10.2 MG/DL    Total Bilirubin 0.3 0.0 - 1.2 MG/DL    ALT 34 12 - 65 U/L    AST 22 15 - 37 U/L    Alk Phosphatase 84 40 - 129 U/L    Total Protein 7.4 6.3 - 8.2 g/dL    Albumin 2.8 (L) 3.2 - 4.6 g/dL    Globulin 4.6 (H) 2.3 - 3.5 g/dL    Albumin/Globulin Ratio 0.6 (L) 1.0 - 1.9     CBC with Auto Differential   Result Value Ref

## 2024-08-12 PROBLEM — D50.9 IRON DEFICIENCY ANEMIA: Status: ACTIVE | Noted: 2024-08-12

## 2024-08-12 PROBLEM — D72.829 LEUKOCYTOSIS: Status: RESOLVED | Noted: 2024-08-11 | Resolved: 2024-08-12

## 2024-08-12 PROBLEM — K50.90 CROHN'S DISEASE (HCC): Status: ACTIVE | Noted: 2024-08-12

## 2024-08-12 PROBLEM — G47.33 OSA ON CPAP: Status: ACTIVE | Noted: 2024-08-12

## 2024-08-12 PROBLEM — H92.01 ACUTE PAIN OF RIGHT EAR: Status: ACTIVE | Noted: 2024-08-12

## 2024-08-12 PROBLEM — E11.9 TYPE 2 DIABETES MELLITUS (HCC): Status: ACTIVE | Noted: 2024-08-12

## 2024-08-12 PROBLEM — M25.561 ACUTE PAIN OF RIGHT KNEE: Status: ACTIVE | Noted: 2024-08-12

## 2024-08-12 PROBLEM — I95.9 HYPOTENSION: Status: RESOLVED | Noted: 2024-08-11 | Resolved: 2024-08-12

## 2024-08-12 PROBLEM — I10 HTN (HYPERTENSION): Status: ACTIVE | Noted: 2024-08-12

## 2024-08-12 LAB
ALBUMIN SERPL-MCNC: 2.5 G/DL (ref 3.2–4.6)
ALBUMIN/GLOB SERPL: 0.6 (ref 1–1.9)
ALP SERPL-CCNC: 74 U/L (ref 40–129)
ALT SERPL-CCNC: 30 U/L (ref 12–65)
ANION GAP SERPL CALC-SCNC: 13 MMOL/L (ref 9–18)
APPEARANCE FLD: NORMAL
AST SERPL-CCNC: 19 U/L (ref 15–37)
BASOPHILS # BLD: 0.1 K/UL (ref 0–0.2)
BASOPHILS NFR BLD: 1 % (ref 0–2)
BILIRUB SERPL-MCNC: 0.2 MG/DL (ref 0–1.2)
BUN SERPL-MCNC: 8 MG/DL (ref 8–23)
CALCIUM SERPL-MCNC: 8.4 MG/DL (ref 8.8–10.2)
CHLORIDE SERPL-SCNC: 105 MMOL/L (ref 98–107)
CO2 SERPL-SCNC: 22 MMOL/L (ref 20–28)
COLOR FLD: YELLOW
CREAT SERPL-MCNC: 0.95 MG/DL (ref 0.8–1.3)
CRP SERPL HS-MCNC: 105 MG/L (ref 0–3)
DIFFERENTIAL METHOD BLD: ABNORMAL
EKG ATRIAL RATE: 101 BPM
EKG DIAGNOSIS: NORMAL
EKG P AXIS: 44 DEGREES
EKG P-R INTERVAL: 175 MS
EKG Q-T INTERVAL: 321 MS
EKG QRS DURATION: 74 MS
EKG QTC CALCULATION (BAZETT): 414 MS
EKG R AXIS: 35 DEGREES
EKG T AXIS: 30 DEGREES
EKG VENTRICULAR RATE: 100 BPM
EOSINOPHIL # BLD: 0.3 K/UL (ref 0–0.8)
EOSINOPHIL NFR BLD: 3 % (ref 0.5–7.8)
ERYTHROCYTE [DISTWIDTH] IN BLOOD BY AUTOMATED COUNT: 18.6 % (ref 11.9–14.6)
GLOBULIN SER CALC-MCNC: 4.3 G/DL (ref 2.3–3.5)
GLUCOSE SERPL-MCNC: 90 MG/DL (ref 70–99)
HCT VFR BLD AUTO: 39 % (ref 41.1–50.3)
HGB BLD-MCNC: 12.4 G/DL (ref 13.6–17.2)
IMM GRANULOCYTES # BLD AUTO: 0 K/UL (ref 0–0.5)
IMM GRANULOCYTES NFR BLD AUTO: 0 % (ref 0–5)
LYMPHOCYTES # BLD: 2.7 K/UL (ref 0.5–4.6)
LYMPHOCYTES NFR BLD: 25 % (ref 13–44)
LYMPHOCYTES NFR BRONCH MANUAL: 4 %
MACROPHAGES NFR BRONCH MANUAL: 6 %
MCH RBC QN AUTO: 26.8 PG (ref 26.1–32.9)
MCHC RBC AUTO-ENTMCNC: 31.8 G/DL (ref 31.4–35)
MCV RBC AUTO: 84.2 FL (ref 82–102)
MONOCYTES # BLD: 0.8 K/UL (ref 0.1–1.3)
MONOCYTES NFR BLD: 8 % (ref 4–12)
NEUTROPHILS NFR BRONCH MANUAL: 90 %
NEUTS SEG # BLD: 6.9 K/UL (ref 1.7–8.2)
NEUTS SEG NFR BLD: 64 % (ref 43–78)
NRBC # BLD: 0 K/UL (ref 0–0.2)
NUC CELL # FLD: NORMAL /CU MM
PLATELET # BLD AUTO: 278 K/UL (ref 150–450)
PMV BLD AUTO: 9.6 FL (ref 9.4–12.3)
POTASSIUM SERPL-SCNC: 3.9 MMOL/L (ref 3.5–5.1)
PROT SERPL-MCNC: 6.8 G/DL (ref 6.3–8.2)
RBC # BLD AUTO: 4.63 M/UL (ref 4.23–5.6)
RBC # FLD: 1000 /CU MM
SODIUM SERPL-SCNC: 140 MMOL/L (ref 136–145)
SPECIMEN SOURCE FLD: NORMAL
URATE SERPL-MCNC: 4.9 MG/DL (ref 3.9–8.2)
WBC # BLD AUTO: 10.9 K/UL (ref 4.3–11.1)

## 2024-08-12 PROCEDURE — 2580000003 HC RX 258: Performed by: FAMILY MEDICINE

## 2024-08-12 PROCEDURE — 85025 COMPLETE CBC W/AUTO DIFF WBC: CPT

## 2024-08-12 PROCEDURE — 1100000000 HC RM PRIVATE

## 2024-08-12 PROCEDURE — 94660 CPAP INITIATION&MGMT: CPT

## 2024-08-12 PROCEDURE — 80053 COMPREHEN METABOLIC PANEL: CPT

## 2024-08-12 PROCEDURE — 99221 1ST HOSP IP/OBS SF/LOW 40: CPT | Performed by: PHYSICIAN ASSISTANT

## 2024-08-12 PROCEDURE — 6360000002 HC RX W HCPCS: Performed by: FAMILY MEDICINE

## 2024-08-12 PROCEDURE — 86141 C-REACTIVE PROTEIN HS: CPT

## 2024-08-12 PROCEDURE — 93010 ELECTROCARDIOGRAM REPORT: CPT | Performed by: INTERNAL MEDICINE

## 2024-08-12 PROCEDURE — 6370000000 HC RX 637 (ALT 250 FOR IP): Performed by: INTERNAL MEDICINE

## 2024-08-12 PROCEDURE — 84550 ASSAY OF BLOOD/URIC ACID: CPT

## 2024-08-12 PROCEDURE — 94760 N-INVAS EAR/PLS OXIMETRY 1: CPT

## 2024-08-12 PROCEDURE — 36415 COLL VENOUS BLD VENIPUNCTURE: CPT

## 2024-08-12 PROCEDURE — 6370000000 HC RX 637 (ALT 250 FOR IP): Performed by: FAMILY MEDICINE

## 2024-08-12 RX ORDER — ACETAMINOPHEN 650 MG/1
650 SUPPOSITORY RECTAL EVERY 6 HOURS PRN
Status: DISCONTINUED | OUTPATIENT
Start: 2024-08-12 | End: 2024-08-15 | Stop reason: HOSPADM

## 2024-08-12 RX ORDER — ACETAMINOPHEN 325 MG/1
650 TABLET ORAL EVERY 6 HOURS PRN
Status: DISCONTINUED | OUTPATIENT
Start: 2024-08-12 | End: 2024-08-15 | Stop reason: HOSPADM

## 2024-08-12 RX ORDER — SEMAGLUTIDE 1.34 MG/ML
1 INJECTION, SOLUTION SUBCUTANEOUS
COMMUNITY

## 2024-08-12 RX ORDER — MODAFINIL 100 MG/1
100 TABLET ORAL DAILY
Status: DISCONTINUED | OUTPATIENT
Start: 2024-08-12 | End: 2024-08-12

## 2024-08-12 RX ORDER — ONDANSETRON 2 MG/ML
4 INJECTION INTRAMUSCULAR; INTRAVENOUS EVERY 6 HOURS PRN
Status: DISCONTINUED | OUTPATIENT
Start: 2024-08-12 | End: 2024-08-15 | Stop reason: HOSPADM

## 2024-08-12 RX ORDER — HYDROCHLOROTHIAZIDE 12.5 MG/1
12.5 CAPSULE ORAL DAILY
Status: DISCONTINUED | OUTPATIENT
Start: 2024-08-12 | End: 2024-08-12

## 2024-08-12 RX ORDER — SODIUM CHLORIDE 0.9 % (FLUSH) 0.9 %
5-40 SYRINGE (ML) INJECTION PRN
Status: DISCONTINUED | OUTPATIENT
Start: 2024-08-12 | End: 2024-08-15 | Stop reason: HOSPADM

## 2024-08-12 RX ORDER — POTASSIUM CHLORIDE 7.45 MG/ML
10 INJECTION INTRAVENOUS PRN
Status: DISCONTINUED | OUTPATIENT
Start: 2024-08-12 | End: 2024-08-15 | Stop reason: HOSPADM

## 2024-08-12 RX ORDER — NEOMYCIN SULFATE, POLYMYXIN B SULFATE AND HYDROCORTISONE 10; 3.5; 1 MG/ML; MG/ML; [USP'U]/ML
3 SUSPENSION/ DROPS AURICULAR (OTIC) 3 TIMES DAILY
Status: DISCONTINUED | OUTPATIENT
Start: 2024-08-12 | End: 2024-08-12

## 2024-08-12 RX ORDER — SODIUM CHLORIDE 9 MG/ML
INJECTION, SOLUTION INTRAVENOUS CONTINUOUS
Status: DISCONTINUED | OUTPATIENT
Start: 2024-08-12 | End: 2024-08-12

## 2024-08-12 RX ORDER — METFORMIN HCL 500 MG
1000 TABLET, EXTENDED RELEASE 24 HR ORAL 2 TIMES DAILY
Status: DISCONTINUED | OUTPATIENT
Start: 2024-08-12 | End: 2024-08-12

## 2024-08-12 RX ORDER — ENOXAPARIN SODIUM 100 MG/ML
30 INJECTION SUBCUTANEOUS 2 TIMES DAILY
Status: DISCONTINUED | OUTPATIENT
Start: 2024-08-12 | End: 2024-08-15 | Stop reason: HOSPADM

## 2024-08-12 RX ORDER — SODIUM CHLORIDE 9 MG/ML
INJECTION, SOLUTION INTRAVENOUS PRN
Status: DISCONTINUED | OUTPATIENT
Start: 2024-08-12 | End: 2024-08-15 | Stop reason: HOSPADM

## 2024-08-12 RX ORDER — TAMSULOSIN HYDROCHLORIDE 0.4 MG/1
0.8 CAPSULE ORAL DAILY
Status: DISCONTINUED | OUTPATIENT
Start: 2024-08-12 | End: 2024-08-15 | Stop reason: HOSPADM

## 2024-08-12 RX ORDER — FUROSEMIDE 20 MG
20 TABLET ORAL DAILY
COMMUNITY

## 2024-08-12 RX ORDER — POTASSIUM CHLORIDE 1500 MG/1
40 TABLET, EXTENDED RELEASE ORAL PRN
Status: DISCONTINUED | OUTPATIENT
Start: 2024-08-12 | End: 2024-08-15 | Stop reason: HOSPADM

## 2024-08-12 RX ORDER — MAGNESIUM SULFATE IN WATER 40 MG/ML
2000 INJECTION, SOLUTION INTRAVENOUS PRN
Status: DISCONTINUED | OUTPATIENT
Start: 2024-08-12 | End: 2024-08-15 | Stop reason: HOSPADM

## 2024-08-12 RX ORDER — ONDANSETRON 4 MG/1
4 TABLET, ORALLY DISINTEGRATING ORAL EVERY 8 HOURS PRN
Status: DISCONTINUED | OUTPATIENT
Start: 2024-08-12 | End: 2024-08-15 | Stop reason: HOSPADM

## 2024-08-12 RX ORDER — FERROUS SULFATE 325(65) MG
325 TABLET ORAL DAILY
Status: DISCONTINUED | OUTPATIENT
Start: 2024-08-12 | End: 2024-08-12

## 2024-08-12 RX ORDER — ATORVASTATIN CALCIUM 40 MG/1
80 TABLET, FILM COATED ORAL DAILY
Status: DISCONTINUED | OUTPATIENT
Start: 2024-08-12 | End: 2024-08-15 | Stop reason: HOSPADM

## 2024-08-12 RX ORDER — POLYETHYLENE GLYCOL 3350 17 G/17G
17 POWDER, FOR SOLUTION ORAL DAILY PRN
Status: DISCONTINUED | OUTPATIENT
Start: 2024-08-12 | End: 2024-08-15 | Stop reason: HOSPADM

## 2024-08-12 RX ORDER — PANTOPRAZOLE SODIUM 40 MG/1
40 TABLET, DELAYED RELEASE ORAL
Status: DISCONTINUED | OUTPATIENT
Start: 2024-08-12 | End: 2024-08-15 | Stop reason: HOSPADM

## 2024-08-12 RX ORDER — SODIUM CHLORIDE 0.9 % (FLUSH) 0.9 %
5-40 SYRINGE (ML) INJECTION EVERY 12 HOURS SCHEDULED
Status: DISCONTINUED | OUTPATIENT
Start: 2024-08-12 | End: 2024-08-15 | Stop reason: HOSPADM

## 2024-08-12 RX ORDER — NEOMYCIN SULFATE, POLYMYXIN B SULFATE AND DEXAMETHASONE 3.5; 10000; 1 MG/ML; [USP'U]/ML; MG/ML
2 SUSPENSION/ DROPS OPHTHALMIC EVERY 6 HOURS SCHEDULED
Status: DISCONTINUED | OUTPATIENT
Start: 2024-08-12 | End: 2024-08-15 | Stop reason: HOSPADM

## 2024-08-12 RX ADMIN — SODIUM CHLORIDE: 9 INJECTION, SOLUTION INTRAVENOUS at 01:01

## 2024-08-12 RX ADMIN — PANTOPRAZOLE SODIUM 40 MG: 40 TABLET, DELAYED RELEASE ORAL at 06:10

## 2024-08-12 RX ADMIN — SODIUM CHLORIDE, PRESERVATIVE FREE 10 ML: 5 INJECTION INTRAVENOUS at 23:26

## 2024-08-12 RX ADMIN — SODIUM CHLORIDE, PRESERVATIVE FREE 10 ML: 5 INJECTION INTRAVENOUS at 09:03

## 2024-08-12 RX ADMIN — NEOMYCIN SULFATE, POLYMYXIN B SULFATE AND DEXAMETHASONE 2 DROP: 3.5; 10000; 1 SUSPENSION/ DROPS OPHTHALMIC at 18:33

## 2024-08-12 RX ADMIN — ENOXAPARIN SODIUM 30 MG: 100 INJECTION SUBCUTANEOUS at 23:25

## 2024-08-12 RX ADMIN — SACUBITRIL AND VALSARTAN 1 TABLET: 49; 51 TABLET, FILM COATED ORAL at 23:25

## 2024-08-12 RX ADMIN — SODIUM CHLORIDE: 9 INJECTION, SOLUTION INTRAVENOUS at 11:45

## 2024-08-12 RX ADMIN — SACUBITRIL AND VALSARTAN 1 TABLET: 49; 51 TABLET, FILM COATED ORAL at 09:00

## 2024-08-12 RX ADMIN — NEOMYCIN SULFATE, POLYMYXIN B SULFATE AND DEXAMETHASONE 2 DROP: 3.5; 10000; 1 SUSPENSION/ DROPS OPHTHALMIC at 23:25

## 2024-08-12 RX ADMIN — METFORMIN HYDROCHLORIDE 1000 MG: 500 TABLET, EXTENDED RELEASE ORAL at 09:00

## 2024-08-12 RX ADMIN — ATORVASTATIN CALCIUM 80 MG: 40 TABLET, FILM COATED ORAL at 09:00

## 2024-08-12 RX ADMIN — AMOXICILLIN AND CLAVULANATE POTASSIUM 1 TABLET: 875; 125 TABLET, FILM COATED ORAL at 23:33

## 2024-08-12 RX ADMIN — ENOXAPARIN SODIUM 30 MG: 100 INJECTION SUBCUTANEOUS at 09:01

## 2024-08-12 RX ADMIN — TAMSULOSIN HYDROCHLORIDE 0.8 MG: 0.4 CAPSULE ORAL at 08:59

## 2024-08-12 ASSESSMENT — PAIN SCALES - GENERAL
PAINLEVEL_OUTOF10: 0
PAINLEVEL_OUTOF10: 0

## 2024-08-12 NOTE — CONSULTS
[facility]  Consultation Note    Patient ID:  Name: Teddy Lane  MRN: [id number]  AGE: 69 y.o.  : 1955    Date of Consultation:  2024  Referring Physician:  Hospitalist     Subjective: Pt complains of right knee pain.  His initial complaint to the emergency department yesterday was headache and right ear pain that has been going on for 2 weeks.  The last 4 days he has had right knee pain not had any problems with his knee in the past.  No history of gout.  They checked a serum uric acid which was normal.  The ER physician aspirated his right knee and the cell count demonstrated 20,000 white blood cells.  Patient was admitted by the hospitalist.  Orthopedics consulted to evaluate the right knee     Past Medical History Includes: No past medical history on file., No past surgical history on file.  Family History: No family history on file.   Social History:   Social History     Tobacco Use    Smoking status: Former     Types: Cigarettes    Smokeless tobacco: Never   Substance Use Topics    Alcohol use: Not Currently       ALLERGIES:   Allergies   Allergen Reactions    Sulfa Antibiotics Other (See Comments)     Arik Markie Syndrome        Patient Medications    Current Facility-Administered Medications   Medication Dose Route Frequency    atorvastatin (LIPITOR) tablet 80 mg  80 mg Oral Daily    pantoprazole (PROTONIX) tablet 40 mg  40 mg Oral QAM AC    tamsulosin (FLOMAX) capsule 0.8 mg  0.8 mg Oral Daily    sodium chloride flush 0.9 % injection 5-40 mL  5-40 mL IntraVENous 2 times per day    sodium chloride flush 0.9 % injection 5-40 mL  5-40 mL IntraVENous PRN    0.9 % sodium chloride infusion   IntraVENous PRN    potassium chloride (KLOR-CON M) extended release tablet 40 mEq  40 mEq Oral PRN    Or    potassium bicarb-citric acid (EFFER-K) effervescent tablet 40 mEq  40 mEq Oral PRN    Or    potassium chloride 10 mEq/100 mL IVPB (Peripheral Line)  10 mEq IntraVENous PRN

## 2024-08-12 NOTE — ACP (ADVANCE CARE PLANNING)
Advance Care Planning   General Advance Care Planning (ACP) Conversation    Date of Conversation: 8/11/2024  Conducted with: Patient with Decision Making Capacity  Other persons present: None    Healthcare Decision Maker:    Primary Decision Maker: Carolyn Mackenzie - [facility] - [phone]    Today we documented Decision Maker(s) consistent with Legal Next of Kin hierarchy.  Content/Action Overview:  No LW HCPOA on file. Daughter legal NOK unless documentation provided.    Length of Voluntary ACP Conversation in minutes:  <16 minutes (Non-Billable)    NEERAJ ESCOBEDO

## 2024-08-12 NOTE — ED NOTES
Orthostatic Vitals:      8/11/2024     9:52 PM   Orthostatic Vitals   Orthostatic B/P and Pulse? Yes   Blood Pressure Lying 125/66   Pulse Lying 82 PER MINUTE   Blood Pressure Sitting 127/69   Pulse Sitting 85 PER MINUTE   Blood Pressure Standing 122/72   Pulse Standing 92 PER MINUTE

## 2024-08-12 NOTE — PROGRESS NOTES
08/12/24 0330   Oxygen Therapy/Pulse Ox   O2 Therapy Room air   Pulse 83   Respirations 12   SpO2 95 %   Skin Protection for O2 Device No   Pulse Oximeter Device Mode Intermittent   Pulse Oximeter Device Location Left;Finger   $Pulse Oximeter $Spot check (single)     Patient set up on Hospital CPAP device with nasal mask to RA as used at home.  Patient is comfortable with good mask fit.

## 2024-08-12 NOTE — ED NOTES
TRANSFER - OUT REPORT:    Verbal report given to Tim BEAVERS on Teddy Lane  being transferred to [place] [address] for routine progression of patient care       Report consisted of patient's Situation, Background, Assessment and   Recommendations(SBAR).     Information from the following report(s) ED SBAR was reviewed with the receiving nurse.    Lines:   Peripheral IV 08/11/24 Right Antecubital (Active)        Opportunity for questions and clarification was provided.      Patient transported with:  Registered Nurse

## 2024-08-12 NOTE — PROGRESS NOTES
Hospitalist Progress Note   Admit Date:  2024  6:23 PM   Name:  Teddy Lane   Age:  69 y.o.  Sex:  male  :  1955   MRN:  [id number]   Room:  [facility]/    Presenting/Chief Complaint: Knee Injury and Otalgia     Reason(s) for Admission: Other specified hypotension [I95.89]  Hypotension [I95.9]  Septicemia (HCC) [A41.9]  Right ear pain [H92.01]  New daily persistent headache [G44.52]  Effusion of right knee [M25.461]     Hospital Course:   69 y.o. male with HTN, DM2, HLD, EDGARDO on CPAP, Crohn's disease, and COVID infection approximately 6 weeks prior to presentation presented to the ER on  with persistent right knee pain and swelling, right inner ear pain, and posterior scalp pain.  He was found to have a leukocytosis.  His right knee was aspirated and he was given vancomycin + Zosyn in the ER.    Subjective & 24hr Events:   He continues to complain of right knee pain, but swelling has improved.  Continues to complain of right inner ear and back of scalp pain.  States it has been there since he had COVID approximately 6 weeks prior.  He has done multiple rounds of antibiotics without resolution.  Denies fevers.  Denies chest pain.  Denies shortness of breath.  Denies N/V.      Assessment & Plan:     Principal Problem:    Acute pain of right knee  Discussed with orthopedics, who suspect pseudogout  Aspirated fluid with 20,000 WBC, which is not elevated to the point of septic joint  Crystal results still pending  Leukocytosis did resolve with antibiotics, so will start Augmentin in the meantime  Appreciate orthopedics input and assistance    Active Problems:    Acute pain of right ear  Pain is actually going on x 6 weeks since he had COVID and sinusitis  Start antibiotic/steroid drops  ER provider evaluated ears and both look good  CT head with no inner ear issues  Leukocytosis did resolve with antibiotics, so will start Augmentin in the meantime    HTN (hypertension)  BP stable  Continue  Glucose 90 70 - 99 mg/dL    BUN 8 8 - 23 MG/DL    Creatinine 0.95 0.80 - 1.30 MG/DL    Est, Glom Filt Rate 87 >60 ml/min/1.73m2    Calcium 8.4 (L) 8.8 - 10.2 MG/DL    Total Bilirubin 0.2 0.0 - 1.2 MG/DL    ALT 30 12 - 65 U/L    AST 19 15 - 37 U/L    Alk Phosphatase 74 40 - 129 U/L    Total Protein 6.8 6.3 - 8.2 g/dL    Albumin 2.5 (L) 3.2 - 4.6 g/dL    Globulin 4.3 (H) 2.3 - 3.5 g/dL    Albumin/Globulin Ratio 0.6 (L) 1.0 - 1.9     CBC with Auto Differential    Collection Time: 08/12/24  5:38 AM   Result Value Ref Range    WBC 10.9 4.3 - 11.1 K/uL    RBC 4.63 4.23 - 5.6 M/uL    Hemoglobin 12.4 (L) 13.6 - 17.2 g/dL    Hematocrit 39.0 (L) 41.1 - 50.3 %    MCV 84.2 82.0 - 102.0 FL    MCH 26.8 26.1 - 32.9 PG    MCHC 31.8 31.4 - 35.0 g/dL    RDW 18.6 (H) 11.9 - 14.6 %    Platelets 278 150 - 450 K/uL    MPV 9.6 9.4 - 12.3 FL    nRBC 0.00 0.0 - 0.2 K/uL    Differential Type AUTOMATED      Neutrophils % 64 43 - 78 %    Lymphocytes % 25 13 - 44 %    Monocytes % 8 4.0 - 12.0 %    Eosinophils % 3 0.5 - 7.8 %    Basophils % 1 0.0 - 2.0 %    Immature Granulocytes % 0 0.0 - 5.0 %    Neutrophils Absolute 6.9 1.7 - 8.2 K/UL    Lymphocytes Absolute 2.7 0.5 - 4.6 K/UL    Monocytes Absolute 0.8 0.1 - 1.3 K/UL    Eosinophils Absolute 0.3 0.0 - 0.8 K/UL    Basophils Absolute 0.1 0.0 - 0.2 K/UL    Immature Granulocytes Absolute 0.0 0.0 - 0.5 K/UL       Recent Labs     08/11/24  1925   COVID19 Not detected       Current Meds:  Current Facility-Administered Medications   Medication Dose Route Frequency    atorvastatin (LIPITOR) tablet 80 mg  80 mg Oral Daily    empagliflozin (JARDIANCE) tablet 10 mg  10 mg Oral Daily    pantoprazole (PROTONIX) tablet 40 mg  40 mg Oral QAM AC    tamsulosin (FLOMAX) capsule 0.8 mg  0.8 mg Oral Daily    sodium chloride flush 0.9 % injection 5-40 mL  5-40 mL IntraVENous 2 times per day    sodium chloride flush 0.9 % injection 5-40 mL  5-40 mL IntraVENous PRN    0.9 % sodium chloride infusion   IntraVENous PRN     potassium chloride (KLOR-CON M) extended release tablet 40 mEq  40 mEq Oral PRN    Or    potassium bicarb-citric acid (EFFER-K) effervescent tablet 40 mEq  40 mEq Oral PRN    Or    potassium chloride 10 mEq/100 mL IVPB (Peripheral Line)  10 mEq IntraVENous PRN    magnesium sulfate 2000 mg in 50 mL IVPB premix  2,000 mg IntraVENous PRN    ondansetron (ZOFRAN-ODT) disintegrating tablet 4 mg  4 mg Oral Q8H PRN    Or    ondansetron (ZOFRAN) injection 4 mg  4 mg IntraVENous Q6H PRN    polyethylene glycol (GLYCOLAX) packet 17 g  17 g Oral Daily PRN    acetaminophen (TYLENOL) tablet 650 mg  650 mg Oral Q6H PRN    Or    acetaminophen (TYLENOL) suppository 650 mg  650 mg Rectal Q6H PRN    0.9 % sodium chloride infusion   IntraVENous Continuous    enoxaparin Sodium (LOVENOX) injection 30 mg  30 mg SubCUTAneous BID    sacubitril-valsartan (ENTRESTO) 49-51 MG per tablet 1 tablet  1 tablet Oral BID    amoxicillin-clavulanate (AUGMENTIN) 875-125 MG per tablet 1 tablet  1 tablet Oral 2 times per day       Signed:  John Shaffer DO    Part of this note may have been written by using a voice dictation software.  The note has been proof read but may still contain some grammatical/other typographical errors.

## 2024-08-12 NOTE — H&P
[facility] Hospital[facility] Initial History and Physical Note    Patient: Teddy Lane Date: 8/11/2024  male, 69 y.o.  Admit Date: 8/11/2024  Attending: Ant Jefferson MD     ASSESSMENT AND PLAN:     Principal Problem:    Hypotension  Plan: Uncertain etiology. Possibly related to R knee inflammation/infection. Improved with IVF, continue IVF.   Active Problems:    R knee effusion  Plan: s/p aspiration in ER. Synovial fluid studies pending. Possible septic knee. IV Vanc received in ER. Consult orthopedic surgery.     Leukocytosis  Plan: Follow CBC    Normocytic anemia  Plan: Follow CBC         DVT Prophylaxis: Lovenox      Code Status: FULL CODE      Disposition: Admit to med/surg for evaluation and treatment as per above.      Anticipated discharge: 2-3 days     CHIEF COMPLAINT:  R knee swelling    HISTORY OF PRESENT ILLNESS:      Patient Active Problem List   Diagnosis    Hypotension    Leukocytosis    Normocytic anemia       Teddy Lane is a 69 y.o. male, with a history of  has no past medical history on file.,  has no past surgical history on file. who presents to the ER with report of R knee swelling and severe pain for the past 4 days. He denies any injury but admits to progressively worsening knee pain and swelling since it began.  Admits to taking a course of amoxicillin recently for sinus infection for which he still has a R temporal headache and some ear pain.  Deneis any fevers, chills, nausea, vomiting    Allergy  Allergies   Allergen Reactions    Sulfa Antibiotics Other (See Comments)     Arik Markie Syndrome       Medication list  Not in a hospital admission.     Past Medical History reviewed, negative    Social History   Social History     Socioeconomic History    Marital status: Single     Social Determinants of Health     Financial Resource Strain: Low Risk  (5/10/2024)    [facility]    [facility] Strain     Difficulty Paying Living Expenses: Not hard at all      08/20/2022        No results found for: \"CKTOTAL\", \"CKMB\", \"CKMBINDEX\", \"TROPONINI\"     Imagining & Other Studies  XR CHEST PORTABLE  Narrative: CXR     HISTORY:    Shortness of breath    COMPARISON:   23 June 2024 chest x-ray    TECHNIQUE:   1 view   of the chest.    FINDINGS:    Lungs are adequately expanded.   The cardiac silhouette measures enlarged.   Pulmonary vascularity is increased.  Osseous structures are within normal limits  for age.  Impression: Diffuse prominence of pulmonary vascularity. Please correlate for fluid  overload.    Electronically signed by Luh Kulkarni  XR KNEE RIGHT (3 VIEWS)  Narrative: Right Knee    INDICATION: Knee pain and swelling, concern for fracture, infectious process,  other    COMPARISON:  None    TECHNIQUE: Three views of the right knee were obtained    FINDINGS: No evidence of acute fracture. There is mild medial joint space  narrowing and small osteophytes. Questionable calcified loose bodies overlying  the medial joint space. Moderate to large joint effusion is suspected.  Impression: Suspected moderate to large joint effusion but no evidence of acute  fracture. Osteoarthritis and suggestion of tiny calcified loose bodies.    Electronically signed by Sam Kaur  CT Head W/O Contrast  Narrative: Head CT    INDICATION: Persistent headache and ear pain.    TECHNIQUE: Multiple 2D axial images obtained through the brain without  intravenous contrast.  Radiation dose reduction techniques were used for this  study:  All CT scans performed at this facility use one or all of the following:  Automated exposure control, adjustment of the mA and/or kVp according to  patient's size, iterative reconstruction.    COMPARISON: None    FINDINGS:  No areas of abnormal attenuation are seen in the brain. There is no CT evidence  of acute hemorrhage or infarction. The ventricles are normal in size. There are  no extra-axial fluid collections. No masses are seen. The sinuses are

## 2024-08-12 NOTE — PROGRESS NOTES
TRANSFER - IN REPORT:    Verbal report received from Tommy BEAVERS on Teddy Lane  being received from ED for routine progression of care      Report consisted of patient's Situation, Background, Assessment and   Recommendations(SBAR).     Information from the following report(s) SBAR, ED Summary, MAR, and Recent Results was reviewed with the receiving nurse.    Opportunity for questions and clarification was provided.      Assessment completed upon patient's arrival to unit and care assumed.

## 2024-08-12 NOTE — ED PROVIDER NOTES
Emergency Department Provider Note       PCP: Not, On File (Inactive)   Age: 69 y.o.   Sex: male     DISPOSITION       No diagnosis found.    Medical Decision Making   Pt with sepsis and swollen right knee. Aspirated joint with cloudy yellow fluid. Abx started and will admit.      1 or more acute illnesses that pose a threat to life or bodily function.   Prescription drug management performed.  Shared medical decision making was utilized in creating the patients health plan today.    I independently ordered and reviewed each unique test.               SEP-1 CORE MEASURE    Fluid Resuscitation Rational: at least 30mL/kg based on entered actual weight at time of triage    Repeat Lactate Level: improving    Reassessment Exam: Not applicable. Patient does not have septic shock.    Critical Care Procedure Note: 45 minutes of critical care time was performed in the emergency department.  This time was separate from any other procedures listed during the patients emergency department course. The failure to initiate these interventions on an urgent basis would likely have resulted in sudden, clinically significant or life-threatening deterioration in the patients condition.  The patient met the criteria for sepsis core measures implementation and this included parenteral fluid resuscitation, parental antibiotics, monitoring, and reassessments.         History     Patient seen by midlevel myself.    The history is provided by the patient. No  was used.       ROS     Review of Systems   Cardiovascular:  Negative for chest pain.   Gastrointestinal:  Negative for abdominal pain.   Musculoskeletal:  Positive for arthralgias and joint swelling.        Physical Exam     Vitals signs and nursing note reviewed:  Vitals:    08/11/24 2021 08/11/24 2031 08/11/24 2131 08/11/24 2152   BP:  122/69 117/66    Pulse: 87 91 84    Resp: 18 (!) 0 13 18   Temp:       TempSrc:       SpO2: 100% 100% 99% 96%   Weight:      fracture. There is mild medial joint space  narrowing and small osteophytes. Questionable calcified loose bodies overlying  the medial joint space. Moderate to large joint effusion is suspected.      Impression    Suspected moderate to large joint effusion but no evidence of acute  fracture. Osteoarthritis and suggestion of tiny calcified loose bodies.      Electronically signed by Sam [facility]   Comprehensive Metabolic Panel   Result Value Ref Range    Sodium 137 136 - 145 mmol/L    Potassium 3.5 3.5 - 5.1 mmol/L    Chloride 100 98 - 107 mmol/L    CO2 22 20 - 28 mmol/L    Anion Gap 15 9 - 18 mmol/L    Glucose 126 (H) 70 - 99 mg/dL    BUN 10 8 - 23 MG/DL    Creatinine 1.10 0.80 - 1.30 MG/DL    Est, Glom Filt Rate 73 >60 ml/min/1.73m2    Calcium 8.8 8.8 - 10.2 MG/DL    Total Bilirubin 0.3 0.0 - 1.2 MG/DL    ALT 34 12 - 65 U/L    AST 22 15 - 37 U/L    Alk Phosphatase 84 40 - 129 U/L    Total Protein 7.4 6.3 - 8.2 g/dL    Albumin 2.8 (L) 3.2 - 4.6 g/dL    Globulin 4.6 (H) 2.3 - 3.5 g/dL    Albumin/Globulin Ratio 0.6 (L) 1.0 - 1.9     CBC with Auto Differential   Result Value Ref Range    WBC 14.9 (H) 4.3 - 11.1 K/uL    RBC 4.51 4.23 - 5.6 M/uL    Hemoglobin 12.0 (L) 13.6 - 17.2 g/dL    Hematocrit 37.2 (L) 41.1 - 50.3 %    MCV 82.5 82.0 - 102.0 FL    MCH 26.6 26.1 - 32.9 PG    MCHC 32.3 31.4 - 35.0 g/dL    RDW 18.2 (H) 11.9 - 14.6 %    Platelets 304 150 - 450 K/uL    MPV 8.8 (L) 9.4 - 12.3 FL    nRBC 0.00 0.0 - 0.2 K/uL    Differential Type AUTOMATED      Neutrophils % 67 43 - 78 %    Lymphocytes % 24 13 - 44 %    Monocytes % 6 4.0 - 12.0 %    Eosinophils % 3 0.5 - 7.8 %    Basophils % 1 0.0 - 2.0 %    Immature Granulocytes % 0 0.0 - 5.0 %    Neutrophils Absolute 9.9 (H) 1.7 - 8.2 K/UL    Lymphocytes Absolute 3.5 0.5 - 4.6 K/UL    Monocytes Absolute 1.0 0.1 - 1.3 K/UL    Eosinophils Absolute 0.4 0.0 - 0.8 K/UL    Basophils Absolute 0.1 0.0 - 0.2 K/UL    Immature Granulocytes Absolute 0.1 0.0 - 0.5 K/UL   Lactate,  Sepsis   Result Value Ref Range    Lactic Acid, Sepsis 2.8 (H) 0.5 - 2.0 MMOL/L   Lactate, Sepsis   Result Value Ref Range    Lactic Acid, Sepsis 1.3 0.5 - 2.0 MMOL/L   Procalcitonin   Result Value Ref Range    Procalcitonin 0.06 0.00 - 0.10 ng/mL   Urinalysis w/Reflex to Micro   Result Value Ref Range    Color, UA YELLOW/STRAW      Appearance CLEAR      Specific Gravity, UA 1.019 1.001 - 1.023      pH, Urine 5.5 5.0 - 9.0      Protein, UA 30 (A) NEG mg/dL    Glucose, Ur Negative mg/dL    Ketones, Urine TRACE (A) NEG mg/dL    Bilirubin, Urine Negative NEG      Blood, Urine Negative NEG      Urobilinogen, Urine 0.2 0.2 - 1.0 EU/dL    Nitrite, Urine Negative NEG      Leukocyte Esterase, Urine Negative NEG      WBC, UA 0-3 0 /hpf    Epithelial Cells, UA 0-3 0 /hpf    BACTERIA, URINE 0 0 /hpf    Casts HYALINE 0 /lpf   EKG 12 Lead   Result Value Ref Range    Ventricular Rate 100 BPM    Atrial Rate 101 BPM    P-R Interval 175 ms    QRS Duration 74 ms    Q-T Interval 321 ms    QTc Calculation (Bazett) 414 ms    P Axis 44 degrees    R Axis 35 degrees    T Axis 30 degrees    Diagnosis Sinus tachycardia  Low voltage, precordial leads            XR CHEST PORTABLE   Final Result   Diffuse prominence of pulmonary vascularity. Please correlate for fluid   overload.         Electronically signed by Luh Kulkarni      XR KNEE RIGHT (3 VIEWS)   Final Result   Suspected moderate to large joint effusion but no evidence of acute   fracture. Osteoarthritis and suggestion of tiny calcified loose bodies.         Electronically signed by Sam Kaur      CT Head W/O Contrast   Final Result   No CT evidence of acute intracranial abnormality.      Electronically signed by Adeel Begum                   Recent Labs     08/11/24  1925   COVID19 Not detected       Voice dictation software was used during the making of this note.  This software is not perfect and grammatical and other typographical errors may be present.  This note has not

## 2024-08-12 NOTE — CARE COORDINATION
SW CM met with pt at bedside for initial CM assessment. Pt admitted with acute pain of right knee. Pt alert and oriented x 4. Demographics, insurance, and PCP discussed and verified. Last PCP visit at [facility] 8/8/2024. Pt lives alone in one level home with six steps to enter. Pt independent with ADLs and an active  in the community PTA. DME of cane and CPAP in the home. No hx of HH services. No falls per pt. Pt plans to drive self home at discharge.     DC/POC to return home via personal vehicle when medically stable.     CM team to continue to follow for any needs that may arise.          08/12/24 2757   Service Assessment   Patient Orientation Alert and Oriented;Person;Place;Situation;Self   Cognition Alert   History Provided By Patient;Medical Record   Primary Caregiver Self   Accompanied By/Relationship N/A   Support Systems Children   Patient's Healthcare Decision Maker is: Legal Next of Kin   PCP Verified by CM Yes  ([facility])   Last Visit to PCP Within last 3 months  (8/8/2024)   Prior Functional Level Independent in ADLs/IADLs   Current Functional Level Independent in ADLs/IADLs   Can patient return to prior living arrangement Yes   Ability to make needs known: Good   Family able to assist with home care needs: No   Would you like for me to discuss the discharge plan with any other family members/significant others, and if so, who? Yes  (daughter Carolyn Mackenzie)   Financial Resources Medicare  ([employer])   Community Resources None   CM/SW Referral Other (see comment)  (discharge planning)   Social/Functional History   Lives With Alone   Type of Home House   Home Layout One level   Home Access Stairs to enter with rails   Entrance Stairs - Number of Steps 6   Entrance Stairs - Rails Both   Bathroom Equipment None   Home Equipment Cane   Receives Help From Family   ADL Assistance Independent   Homemaking Assistance Independent   Homemaking  Responsibilities Yes   Ambulation Assistance Independent   Transfer Assistance Independent   Active  Yes   Mode of Transportation Car   Occupation Unemployed   Discharge Planning   Type of Residence House   Living Arrangements Alone   Current Services Prior To Admission C-pap;Durable Medical Equipment   Current DME Prior to Arrival Cane   Potential Assistance Needed N/A   DME Ordered? No   Potential Assistance Purchasing Medications No   Type of Home Care Services None   Patient expects to be discharged to: House   One/Two Story Residence One story   History of falls? 0   Condition of Participation: Discharge Planning   The Plan for Transition of Care is related to the following treatment goals: Pt to return home via personal vehicle when medically stable.     Tania Pal, MSW, LBSW    [facility]

## 2024-08-13 LAB
ALBUMIN SERPL-MCNC: 2.3 G/DL (ref 3.2–4.6)
ALBUMIN/GLOB SERPL: 0.5 (ref 1–1.9)
ALP SERPL-CCNC: 73 U/L (ref 40–129)
ALT SERPL-CCNC: 27 U/L (ref 12–65)
ANION GAP SERPL CALC-SCNC: 12 MMOL/L (ref 9–18)
AST SERPL-CCNC: 20 U/L (ref 15–37)
BASOPHILS # BLD: 0.1 K/UL (ref 0–0.2)
BASOPHILS NFR BLD: 1 % (ref 0–2)
BILIRUB SERPL-MCNC: 0.2 MG/DL (ref 0–1.2)
BODY FLD TYPE: NORMAL
BUN SERPL-MCNC: 7 MG/DL (ref 8–23)
CALCIUM SERPL-MCNC: 8.2 MG/DL (ref 8.8–10.2)
CHLORIDE SERPL-SCNC: 104 MMOL/L (ref 98–107)
CO2 SERPL-SCNC: 23 MMOL/L (ref 20–28)
CREAT SERPL-MCNC: 0.85 MG/DL (ref 0.8–1.3)
CRYSTALS FLD MICRO: NORMAL
DIFFERENTIAL METHOD BLD: ABNORMAL
EOSINOPHIL # BLD: 0.2 K/UL (ref 0–0.8)
EOSINOPHIL NFR BLD: 2 % (ref 0.5–7.8)
ERYTHROCYTE [DISTWIDTH] IN BLOOD BY AUTOMATED COUNT: 18.1 % (ref 11.9–14.6)
GLOBULIN SER CALC-MCNC: 4.2 G/DL (ref 2.3–3.5)
GLUCOSE SERPL-MCNC: 98 MG/DL (ref 70–99)
HCT VFR BLD AUTO: 34.4 % (ref 41.1–50.3)
HGB BLD-MCNC: 11.1 G/DL (ref 13.6–17.2)
IMM GRANULOCYTES # BLD AUTO: 0 K/UL (ref 0–0.5)
IMM GRANULOCYTES NFR BLD AUTO: 0 % (ref 0–5)
LYMPHOCYTES # BLD: 3 K/UL (ref 0.5–4.6)
LYMPHOCYTES NFR BLD: 26 % (ref 13–44)
MCH RBC QN AUTO: 26.7 PG (ref 26.1–32.9)
MCHC RBC AUTO-ENTMCNC: 32.3 G/DL (ref 31.4–35)
MCV RBC AUTO: 82.7 FL (ref 82–102)
MONOCYTES # BLD: 0.9 K/UL (ref 0.1–1.3)
MONOCYTES NFR BLD: 8 % (ref 4–12)
NEUTS SEG # BLD: 7.2 K/UL (ref 1.7–8.2)
NEUTS SEG NFR BLD: 63 % (ref 43–78)
NRBC # BLD: 0 K/UL (ref 0–0.2)
PLATELET # BLD AUTO: 306 K/UL (ref 150–450)
PMV BLD AUTO: 9.1 FL (ref 9.4–12.3)
POTASSIUM SERPL-SCNC: 3.7 MMOL/L (ref 3.5–5.1)
PROT SERPL-MCNC: 6.5 G/DL (ref 6.3–8.2)
RBC # BLD AUTO: 4.16 M/UL (ref 4.23–5.6)
SODIUM SERPL-SCNC: 139 MMOL/L (ref 136–145)
URATE SERPL-MCNC: 4.5 MG/DL (ref 3.9–8.2)
WBC # BLD AUTO: 11.4 K/UL (ref 4.3–11.1)

## 2024-08-13 PROCEDURE — 6370000000 HC RX 637 (ALT 250 FOR IP): Performed by: STUDENT IN AN ORGANIZED HEALTH CARE EDUCATION/TRAINING PROGRAM

## 2024-08-13 PROCEDURE — 6370000000 HC RX 637 (ALT 250 FOR IP): Performed by: FAMILY MEDICINE

## 2024-08-13 PROCEDURE — 1100000000 HC RM PRIVATE

## 2024-08-13 PROCEDURE — 97165 OT EVAL LOW COMPLEX 30 MIN: CPT

## 2024-08-13 PROCEDURE — 97535 SELF CARE MNGMENT TRAINING: CPT

## 2024-08-13 PROCEDURE — 97530 THERAPEUTIC ACTIVITIES: CPT

## 2024-08-13 PROCEDURE — 36415 COLL VENOUS BLD VENIPUNCTURE: CPT

## 2024-08-13 PROCEDURE — 2500000003 HC RX 250 WO HCPCS: Performed by: FAMILY MEDICINE

## 2024-08-13 PROCEDURE — 6370000000 HC RX 637 (ALT 250 FOR IP)

## 2024-08-13 PROCEDURE — 6370000000 HC RX 637 (ALT 250 FOR IP): Performed by: INTERNAL MEDICINE

## 2024-08-13 PROCEDURE — 84550 ASSAY OF BLOOD/URIC ACID: CPT

## 2024-08-13 PROCEDURE — 2580000003 HC RX 258: Performed by: FAMILY MEDICINE

## 2024-08-13 PROCEDURE — 6360000002 HC RX W HCPCS: Performed by: FAMILY MEDICINE

## 2024-08-13 PROCEDURE — 94760 N-INVAS EAR/PLS OXIMETRY 1: CPT

## 2024-08-13 PROCEDURE — 85025 COMPLETE CBC W/AUTO DIFF WBC: CPT

## 2024-08-13 PROCEDURE — 80053 COMPREHEN METABOLIC PANEL: CPT

## 2024-08-13 PROCEDURE — 94660 CPAP INITIATION&MGMT: CPT

## 2024-08-13 PROCEDURE — 97161 PT EVAL LOW COMPLEX 20 MIN: CPT

## 2024-08-13 RX ORDER — NEOMYCIN SULFATE, POLYMYXIN B SULFATE AND DEXAMETHASONE 3.5; 10000; 1 MG/ML; [USP'U]/ML; MG/ML
2 SUSPENSION/ DROPS OPHTHALMIC DAILY
Qty: 5 ML | Refills: 0 | Status: SHIPPED | OUTPATIENT
Start: 2024-08-13 | End: 2024-10-02

## 2024-08-13 RX ORDER — MORPHINE SULFATE 2 MG/ML
1 INJECTION, SOLUTION INTRAMUSCULAR; INTRAVENOUS ONCE
Status: COMPLETED | OUTPATIENT
Start: 2024-08-13 | End: 2024-08-13

## 2024-08-13 RX ORDER — OXYCODONE HYDROCHLORIDE 5 MG/1
TABLET ORAL
Status: COMPLETED
Start: 2024-08-13 | End: 2024-08-13

## 2024-08-13 RX ORDER — OXYCODONE HYDROCHLORIDE 5 MG/1
5 TABLET ORAL EVERY 4 HOURS PRN
Status: DISCONTINUED | OUTPATIENT
Start: 2024-08-13 | End: 2024-08-15 | Stop reason: HOSPADM

## 2024-08-13 RX ADMIN — SODIUM CHLORIDE, PRESERVATIVE FREE 10 ML: 5 INJECTION INTRAVENOUS at 09:11

## 2024-08-13 RX ADMIN — NEOMYCIN SULFATE, POLYMYXIN B SULFATE AND DEXAMETHASONE 2 DROP: 3.5; 10000; 1 SUSPENSION/ DROPS OPHTHALMIC at 05:17

## 2024-08-13 RX ADMIN — NEOMYCIN SULFATE, POLYMYXIN B SULFATE AND DEXAMETHASONE 2 DROP: 3.5; 10000; 1 SUSPENSION/ DROPS OPHTHALMIC at 12:39

## 2024-08-13 RX ADMIN — ATORVASTATIN CALCIUM 80 MG: 40 TABLET, FILM COATED ORAL at 09:10

## 2024-08-13 RX ADMIN — PANTOPRAZOLE SODIUM 40 MG: 40 TABLET, DELAYED RELEASE ORAL at 05:17

## 2024-08-13 RX ADMIN — ENOXAPARIN SODIUM 30 MG: 100 INJECTION SUBCUTANEOUS at 20:48

## 2024-08-13 RX ADMIN — OXYCODONE 5 MG: 5 TABLET ORAL at 21:42

## 2024-08-13 RX ADMIN — ENOXAPARIN SODIUM 30 MG: 100 INJECTION SUBCUTANEOUS at 09:10

## 2024-08-13 RX ADMIN — OXYCODONE 5 MG: 5 TABLET ORAL at 15:25

## 2024-08-13 RX ADMIN — SACUBITRIL AND VALSARTAN 1 TABLET: 49; 51 TABLET, FILM COATED ORAL at 20:48

## 2024-08-13 RX ADMIN — AMOXICILLIN AND CLAVULANATE POTASSIUM 1 TABLET: 875; 125 TABLET, FILM COATED ORAL at 09:10

## 2024-08-13 RX ADMIN — NEOMYCIN SULFATE, POLYMYXIN B SULFATE AND DEXAMETHASONE 2 DROP: 3.5; 10000; 1 SUSPENSION/ DROPS OPHTHALMIC at 18:00

## 2024-08-13 RX ADMIN — TAMSULOSIN HYDROCHLORIDE 0.8 MG: 0.4 CAPSULE ORAL at 09:10

## 2024-08-13 RX ADMIN — AMOXICILLIN AND CLAVULANATE POTASSIUM 1 TABLET: 875; 125 TABLET, FILM COATED ORAL at 20:48

## 2024-08-13 RX ADMIN — MORPHINE SULFATE 1 MG: 2 INJECTION, SOLUTION INTRAMUSCULAR; INTRAVENOUS at 06:30

## 2024-08-13 RX ADMIN — SACUBITRIL AND VALSARTAN 1 TABLET: 49; 51 TABLET, FILM COATED ORAL at 09:10

## 2024-08-13 ASSESSMENT — PAIN DESCRIPTION - ORIENTATION
ORIENTATION: RIGHT

## 2024-08-13 ASSESSMENT — PAIN SCALES - GENERAL
PAINLEVEL_OUTOF10: 3
PAINLEVEL_OUTOF10: 5
PAINLEVEL_OUTOF10: 7
PAINLEVEL_OUTOF10: 6

## 2024-08-13 ASSESSMENT — PAIN DESCRIPTION - LOCATION
LOCATION: KNEE

## 2024-08-13 ASSESSMENT — PAIN DESCRIPTION - DESCRIPTORS
DESCRIPTORS: ACHING
DESCRIPTORS: DISCOMFORT

## 2024-08-13 NOTE — CARE COORDINATION
Pt is for discharge home today with family and no needs/supportive care orders recieved for MSW at this time.       08/13/24 1127   Service Assessment   Patient's Healthcare Decision Maker is: Legal Next of Kin   Social/Functional History   Lives With Alone   Type of Home House   Home Layout One level   Home Access Stairs to enter with rails   Entrance Stairs - Number of Steps 6   Entrance Stairs - Rails Both   Bathroom Equipment None   Home Equipment Cane   Receives Help From Family   ADL Assistance Independent   Homemaking Assistance Independent   Homemaking Responsibilities Yes   Ambulation Assistance Independent   Transfer Assistance Independent   Active  Yes   Mode of Transportation Car   Occupation Unemployed   Services At/After Discharge   Transition of Care Consult (CM Consult) Discharge Planning   Services At/After Discharge None    Resource Information Provided? No   Mode of Transport at Discharge Self   Confirm Follow Up Transport Self   Condition of Participation: Discharge Planning   The Plan for Transition of Care is related to the following treatment goals: Patient to return home via personal vehicle with no needs.   The Patient and/or Patient Representative was provided with a Choice of Provider? Patient   The Patient and/Or Patient Representative agree with the Discharge Plan? Yes   Freedom of Choice list was provided with basic dialogue that supports the patient's individualized plan of care/goals, treatment preferences, and shares the quality data associated with the providers?  Yes     Tania Pal, MSW, SW    [facility]

## 2024-08-13 NOTE — DISCHARGE SUMMARY
Hospitalist Discharge Summary   Admit Date:  2024  6:23 PM   DC Note date: 2024  Name:  Teddy Lane   Age:  69 y.o.  Sex:  male  :  1955   MRN:  [id number]   Room:  [facility]  PCP:  Not, On File (Inactive)    Presenting Complaint: Knee Injury and Otalgia     Initial Admission Diagnosis: Other specified hypotension [I95.89]  Hypotension [I95.9]  Septicemia (HCC) [A41.9]  Right ear pain [H92.01]  New daily persistent headache [G44.52]  Effusion of right knee [M25.461]     Problem List for this Hospitalization (present on admission):    Principal Problem:    Acute pain of right knee  Active Problems:    Normocytic anemia    Acute pain of right ear    EDGARDO on CPAP    Type 2 diabetes mellitus (HCC)    HTN (hypertension)    Crohn's disease (HCC)    Iron deficiency anemia  Resolved Problems:    Hypotension    Leukocytosis      Hospital Course:  69 y.o. male with HTN, DM2, HLD, DEGARDO on CPAP, Crohn's disease, and COVID infection approximately 6 weeks prior to presentation presented to the ER on  with persistent right knee pain and swelling, right inner ear pain, and posterior scalp pain.  He was found to have a leukocytosis.  His right knee was aspirated and he was given vancomycin + Zosyn in the ER.   Patient presented with acute pain of right knee.  Discussed with orthopedics who suspected pseudogout.  Aspirated fluid with 20,000 WBC which is not elevated to the point of septic joint.  Leukocytosis did resolve with antibiotics.  Patient will continue Augmentin to finish course.  Patient will also follow-up with orthopedics in outpatient setting.  Patient has been having some acute onset ear pain to the right ear.  Patient states been going on for greater than 6 weeks since he had COVID and sinusitis.  Started on antibiotics/steroid drops.  CT head without any inner ear abnormality.  Continue Augmentin.  For patient's other comorbid conditions he was restarted back on his home medications.  On day of  Inject 1 mg into the skin every 7 days      Cholecalciferol 50 MCG (2000 UT) TABS Take 1 tablet by mouth daily      albuterol sulfate HFA (PROVENTIL;VENTOLIN;PROAIR) 108 (90 Base) MCG/ACT inhaler Inhale 2 puffs into the lungs every 6 hours as needed      atorvastatin (LIPITOR) 80 MG tablet Take 1 tablet by mouth daily      esomeprazole (NEXIUM) 40 MG delayed release capsule Take 1 capsule by mouth 2 times daily      sacubitril-valsartan (ENTRESTO)  MG per tablet Take 1 tablet by mouth 2 times daily      tamsulosin (FLOMAX) 0.4 MG capsule Take 2 capsules by mouth daily           STOP taking these medications       Hyoscyamine Sulfate SL (LEVSIN/SL) 0.125 MG SUBL Comments:   Reason for Stopping:         dapagliflozin (FARXIGA) 10 MG tablet Comments:   Reason for Stopping:         ferrous sulfate (IRON 325) 325 (65 Fe) MG tablet Comments:   Reason for Stopping:         hydroCHLOROthiazide (MICROZIDE) 12.5 MG capsule Comments:   Reason for Stopping:         metFORMIN (GLUCOPHAGE-XR) 500 MG extended release tablet Comments:   Reason for Stopping:         modafinil (PROVIGIL) 100 MG tablet Comments:   Reason for Stopping:               Some of the medications may be marked as \"stop taking\" by the system; but in reality patient or family reported already being off these meds; defer to outpatient/prescribing providers.      Procedures done this admission:  * No surgery found *    Consults this admission:  IP CONSULT TO ORTHOPEDIC SURGERY  IP CONSULT TO RESPIRATORY CARE    Consultants will arrange their own follow ups, if applicable.    Echocardiogram results:  No results found for this or any previous visit.      Diagnostic Imaging/Tests:   XR CHEST PORTABLE    Result Date: 8/11/2024  Diffuse prominence of pulmonary vascularity. Please correlate for fluid overload. Electronically signed by Luh Kulkarni    XR KNEE RIGHT (3 VIEWS)    Result Date: 8/11/2024  Suspected moderate to large joint effusion but no evidence  for Strep A nucleic acid  Methodology: Isothermal Nucleic Acid Amplification         Blood Culture 2 [[id number]] Collected: 08/11/24 1845    Order Status: Completed Specimen: Blood Updated: 08/13/24 0719     Special Requests --        NO SPECIAL REQUESTS  LEFT  Antecubital       Culture NO GROWTH 2 DAYS       Blood Culture 1 [[id number]] Collected: 08/11/24 1834    Order Status: Completed Specimen: Blood Updated: 08/13/24 0719     Special Requests --        NO SPECIAL REQUESTS  RIGHT  Antecubital       Culture NO GROWTH 2 DAYS               All Labs from Last 24 Hrs:  Recent Results (from the past 24 hour(s))   Comprehensive Metabolic Panel w/ Reflex to MG    Collection Time: 08/13/24  5:38 AM   Result Value Ref Range    Sodium 139 136 - 145 mmol/L    Potassium 3.7 3.5 - 5.1 mmol/L    Chloride 104 98 - 107 mmol/L    CO2 23 20 - 28 mmol/L    Anion Gap 12 9 - 18 mmol/L    Glucose 98 70 - 99 mg/dL    BUN 7 (L) 8 - 23 MG/DL    Creatinine 0.85 0.80 - 1.30 MG/DL    Est, Glom Filt Rate >90 >60 ml/min/1.73m2    Calcium 8.2 (L) 8.8 - 10.2 MG/DL    Total Bilirubin 0.2 0.0 - 1.2 MG/DL    ALT 27 12 - 65 U/L    AST 20 15 - 37 U/L    Alk Phosphatase 73 40 - 129 U/L    Total Protein 6.5 6.3 - 8.2 g/dL    Albumin 2.3 (L) 3.2 - 4.6 g/dL    Globulin 4.2 (H) 2.3 - 3.5 g/dL    Albumin/Globulin Ratio 0.5 (L) 1.0 - 1.9     CBC with Auto Differential    Collection Time: 08/13/24  5:38 AM   Result Value Ref Range    WBC 11.4 (H) 4.3 - 11.1 K/uL    RBC 4.16 (L) 4.23 - 5.6 M/uL    Hemoglobin 11.1 (L) 13.6 - 17.2 g/dL    Hematocrit 34.4 (L) 41.1 - 50.3 %    MCV 82.7 82.0 - 102.0 FL    MCH 26.7 26.1 - 32.9 PG    MCHC 32.3 31.4 - 35.0 g/dL    RDW 18.1 (H) 11.9 - 14.6 %    Platelets 306 150 - 450 K/uL    MPV 9.1 (L) 9.4 - 12.3 FL    nRBC 0.00 0.0 - 0.2 K/uL    Differential Type AUTOMATED      Neutrophils % 63 43 - 78 %    Lymphocytes % 26 13 - 44 %    Monocytes % 8 4.0 - 12.0 %    Eosinophils % 2 0.5 - 7.8 %    Basophils % 1 0.0 - 2.0 %     Immature Granulocytes % 0 0.0 - 5.0 %    Neutrophils Absolute 7.2 1.7 - 8.2 K/UL    Lymphocytes Absolute 3.0 0.5 - 4.6 K/UL    Monocytes Absolute 0.9 0.1 - 1.3 K/UL    Eosinophils Absolute 0.2 0.0 - 0.8 K/UL    Basophils Absolute 0.1 0.0 - 0.2 K/UL    Immature Granulocytes Absolute 0.0 0.0 - 0.5 K/UL       Recent Labs     08/11/24  1925   COVID19 Not detected       Recent Vital Data:  Patient Vitals for the past 24 hrs:   Temp Pulse Resp BP SpO2   08/13/24 0725 98.4 °F (36.9 °C) 86 20 (!) 134/92 90 %   08/13/24 0630 -- -- 20 -- --   08/12/24 2325 -- -- -- (!) 145/88 --   08/12/24 2151 -- 86 18 -- 95 %   08/12/24 2018 99.1 °F (37.3 °C) 86 18 (!) 145/78 94 %       Oxygen Therapy  SpO2: 90 %  Pulse via Oximetry: 85 beats per minute  Pulse Oximeter Device Mode: Intermittent  Pulse Oximeter Device Location: Left, Finger  O2 Device: PAP (positive airway pressure)  Skin Protection for O2 Device: No  FiO2 : 21 %    Estimated body mass index is 44.09 kg/m² as calculated from the following:    Height as of this encounter: 1.727 m (5' 8\").    Weight as of this encounter: 131.5 kg (290 lb).    Intake/Output Summary (Last 24 hours) at 8/13/2024 1123  Last data filed at 8/13/2024 0933  Gross per 24 hour   Intake 2004.62 ml   Output 600 ml   Net 1404.62 ml         Physical Exam:  General:    Well nourished.  No overt distress  Head:  Normocephalic, atraumatic  Eyes:  Sclerae appear normal.  Pupils equally round.    HENT:  Nares appear normal, no drainage.  Moist mucous membranes  Neck:  No restricted ROM.  Trachea midline  CV:   RRR.  No m/r/g.  No JVD  Lungs:   CTAB.  No wheezing, rhonchi, or rales.  Respirations even, unlabored  Abdomen:   Soft, nontender, nondistended.    Extremities: Warm and dry.   No edema.    Skin:     No rashes.  Normal coloration  Neuro:  CN II-XII grossly intact.  Psych:  Normal mood and affect.      Signed:  Ned Correa MD    Part of this note may have been written by using a voice dictation

## 2024-08-13 NOTE — PROGRESS NOTES
ACUTE OCCUPATIONAL THERAPY GOALS:   (Developed with and agreed upon by patient and/or caregiver.)  1. Patient will perform grooming with supervision.  2. Patient will perform upper body dressing with supervision.  3. Patient will perform lower body dressing with SBA.  4. Patient will perform bathing with SBA.  5. Patient will perform toileting and toilet transfer with SBA.  6. Patient will perform ADL functional mobility and tranfers in room with SBA.  7. Patient/family to demonstrate knowledge of home safety and DME recommendations.    Goals to be achieved in 7 days.     OCCUPATIONAL THERAPY Initial Assessment, Daily Note, and PM       OT Visit Days: 1  Acknowledge Orders  Time  OT Charge Capture  Rehab Caseload Tracker      Teddy Lane is a 69 y.o. male   PRIMARY DIAGNOSIS: Acute pain of right knee  Other specified hypotension [I95.89]  Hypotension [I95.9]  Septicemia (HCC) [A41.9]  Right ear pain [H92.01]  New daily persistent headache [G44.52]  Effusion of right knee [M25.461]       Reason for Referral: Pain in Right Knee (M25.561)  Stiffness of Right Knee, Not elsewhere classified (M25.661)  Generalized Muscle Weakness (M62.81)  Other lack of cordination (R27.8)  Difficulty in walking, Not elsewhere classified (R26.2)  Other abnormalities of gait and mobility (R26.89)  Inpatient: Payor: [employer] MEDICARE / Plan: [employer] GOLD PLUS HMO / Product Type: *No Product type* /     ASSESSMENT:     REHAB RECOMMENDATIONS:   Recommendation to date pending progress:  Setting:  Home Health Therapy    Equipment:    Rolling Walker     ASSESSMENT:  Mr. Lane supine in bed cPAP in place. He is agreeable to work with therapy. He has been having new onset knee pain since last week. He did not have a fall. He has been getting up with her st. Cane in the room by himself. He is having more pain now. He was min supine to sit. He donned a gown and stood with min . He ambulated slowly in the room with a ROLLING WALKER with contact  EVALUATION: INTACT IMPAIRED   (See Comments)   UE AROM [x] []   UE PROM [] []   Strength [x]       Posture / Balance []  Cga in standing   Sensation []     Coordination []    Cga in standing   Tone []       Edema []    Activity Tolerance []  Fair with mobility and ADLS     Hand Dominance R [] L []      COGNITION/  PERCEPTION: INTACT IMPAIRED   (See Comments)   Orientation [x]     Vision [x]     Hearing [x]     Cognition  [x]     Perception [x]       MOBILITY: I Mod I S SBA CGA Min Mod Max Total  NT x2 Comments:   Bed Mobility    Rolling [] [] [] [] [] [] [] [] [] [] []    Supine to Sit [] [] [] [] [] [x] [] [] [] [] []    Scooting [] [] [] [] [x] [] [] [] [] [] []    Sit to Supine [] [] [] [] [] [] [] [] [] [] []    Transfers    Sit to Stand [] [] [] [] [] [x] [] [] [] [] []    Bed to Chair [] [] [] [] [] [] [] [] [] [] []    Stand to Sit [] [] [] [] [] [x] [] [] [] [] []    Tub/Shower [] [] [] [] [] [] [] [] [] [] []     Toilet [] [] [] [] [] [] [] [] [] [] []      [] [] [] [] [] [] [] [] [] [] []    I=Independent, Mod I=Modified Independent, S=Supervision/Setup, SBA=Standby Assistance, CGA=Contact Guard Assistance, Min=Minimal Assistance, Mod=Moderate Assistance, Max=Maximal Assistance, Total=Total Assistance, NT=Not Tested    ACTIVITIES OF DAILY LIVING: I Mod I S SBA CGA Min Mod Max Total NT Comments   BASIC ADLs:              Upper Body Bathing  [] [] [] [] [] [] [] [] [] []     Lower Body Bathing [] [] [] [] [] [] [] [] [] []     Toileting [] [] [] [x] [] [] [] [] [] [] He's been using the urinal bedside   Upper Body Dressing [] [] [x] [] [] [] [] [] [] []    Lower Body Dressing [] [] [] [] [] [x] [] [] [] [] Don socks   Feeding [] [] [x] [] [] [] [] [] [] []    Grooming [] [] [x] [] [] [] [] [] [] []    Personal Device Care [] [] [] [] [] [] [] [] [] []    Functional Mobility [] [] [] [] [x] [] [] [] [] [] RW   I=Independent, Mod I=Modified Independent, S=Supervision/Setup, SBA=Standby Assistance, CGA=Contact

## 2024-08-13 NOTE — PROGRESS NOTES
ACUTE PHYSICAL THERAPY GOALS:   (Developed with and agreed upon by patient and/or caregiver.)  GOALS (1-4 days):  (1.)Mr. Lane will move from supine to sit and sit to supine  in bed with STAND BY ASSIST.  (2.)Mr. Lane will transfer from bed to chair and chair to bed with STAND BY ASSIST using the least restrictive device.  (3.)Mr. Lane will ambulate with STAND BY ASSIST for 100 feet with the least restrictive device.  (4.)Mr. Lane will ambulate up/down 6 steps with bilateral  railing with MINIMAL ASSIST.  (5.)Mr. Lane will increase right knee ROM to -5 to 90°.  ________________________________________________________________________________________________            PHYSICAL THERAPY Initial Assessment and PM  (Link to Caseload Tracking: PT Visit Days : 1  Acknowledge Orders  Time In/Out  PT Charge Capture  Rehab Caseload Tracker    Teddy Lane is a 69 y.o. male   PRIMARY DIAGNOSIS: Acute pain of right knee  Other specified hypotension [I95.89]  Hypotension [I95.9]  Septicemia (HCC) [A41.9]  Right ear pain [H92.01]  New daily persistent headache [G44.52]  Effusion of right knee [M25.461]       Reason for Referral: Other abnormalities of gait and mobility (R26.89)  Inpatient: Payor: [employer] MEDICARE / Plan: [employer] GOLD PLUS HMO / Product Type: *No Product type* /     ASSESSMENT:     REHAB RECOMMENDATIONS:   Recommendation to date pending progress:  Setting:  Home Health Therapy    Equipment:     Needs a RW     ASSESSMENT:  Mr. Lane presents with decreased with functional mobility and decreased rom and strength of right LE due to right knee pain.  He lives alone and is normally independent.  He has a cane.  He drives and works part time.  This afternoon, he reports continued right knee pain. It is swollen and warm to touch.  He has limited rom of the knee due to pain and strength <3/5 of the right knee due to pain.  Min assist to sit up and to stand with RW.  He ambulated in room with RW CGA with  antalgic gait and definite use of Ue's with RW due to pain.  Gait is slow and limited.  He returned to bed.  Ice applied to knee.  Will follow.  He would benefit from continued PT here and HHPT.  He has a daughter that can assist him at home.     [facility]™ “6 Clicks” Basic Mobility Inpatient Short Form  AM-PAC Basic Mobility - Inpatient   How much help is needed turning from your back to your side while in a flat bed without using bedrails?: A Little  How much help is needed moving from lying on your back to sitting on the side of a flat bed without using bedrails?: A Little  How much help is needed moving to and from a bed to a chair?: A Little  How much help is needed standing up from a chair using your arms?: A Little  How much help is needed walking in hospital room?: A Little  How much help is needed climbing 3-5 steps with a railing?: A Lot  AM-PAC Inpatient Mobility Raw Score : 17  AM-PAC Inpatient T-Scale Score : 42.13  Mobility Inpatient CMS 0-100% Score: 50.57  Mobility Inpatient CMS G-Code Modifier : CK    SUBJECTIVE:   Mr. Lane states, \"hurts\"     Social/Functional Lives With: Alone  Type of Home: House  Home Layout: One level  Home Access: Stairs to enter with rails  Entrance Stairs - Number of Steps: 6  Entrance Stairs - Rails: Both  Bathroom Equipment: None  Home Equipment: Cane  Receives Help From: Family  ADL Assistance: Independent  Homemaking Assistance: Independent  Homemaking Responsibilities: Yes  Ambulation Assistance: Independent  Transfer Assistance: Independent  Active : Yes  Mode of Transportation: Car  Occupation: Unemployed    OBJECTIVE:     PAIN: VITALS / O2: PRECAUTION / LINES / DRAINS:   Pre Treatment: 5 at rest         Post Treatment: 7 with gait Vitals        Oxygen      None    RESTRICTIONS/PRECAUTIONS:                    GROSS EVALUATION:  Intact Impaired (Comments):   AROM []  Left LE wfl, Right LE decreased, right knee grossly -15 to 60 degrees   PROM []

## 2024-08-14 LAB
ALBUMIN SERPL-MCNC: 2.3 G/DL (ref 3.2–4.6)
ALBUMIN/GLOB SERPL: 0.5 (ref 1–1.9)
ALP SERPL-CCNC: 71 U/L (ref 40–129)
ALT SERPL-CCNC: 27 U/L (ref 12–65)
ANION GAP SERPL CALC-SCNC: 12 MMOL/L (ref 9–18)
AST SERPL-CCNC: 18 U/L (ref 15–37)
BACTERIA SPEC CULT: NORMAL
BASOPHILS # BLD: 0.1 K/UL (ref 0–0.2)
BASOPHILS NFR BLD: 1 % (ref 0–2)
BILIRUB SERPL-MCNC: 0.3 MG/DL (ref 0–1.2)
BUN SERPL-MCNC: 7 MG/DL (ref 8–23)
CALCIUM SERPL-MCNC: 8.4 MG/DL (ref 8.8–10.2)
CHLORIDE SERPL-SCNC: 103 MMOL/L (ref 98–107)
CO2 SERPL-SCNC: 23 MMOL/L (ref 20–28)
CREAT SERPL-MCNC: 0.86 MG/DL (ref 0.8–1.3)
DIFFERENTIAL METHOD BLD: ABNORMAL
EOSINOPHIL # BLD: 0.2 K/UL (ref 0–0.8)
EOSINOPHIL NFR BLD: 1 % (ref 0.5–7.8)
ERYTHROCYTE [DISTWIDTH] IN BLOOD BY AUTOMATED COUNT: 17.8 % (ref 11.9–14.6)
GLOBULIN SER CALC-MCNC: 4.3 G/DL (ref 2.3–3.5)
GLUCOSE SERPL-MCNC: 99 MG/DL (ref 70–99)
GRAM STN SPEC: NORMAL
GRAM STN SPEC: NORMAL
HCT VFR BLD AUTO: 34.4 % (ref 41.1–50.3)
HGB BLD-MCNC: 11.2 G/DL (ref 13.6–17.2)
IMM GRANULOCYTES # BLD AUTO: 0 K/UL (ref 0–0.5)
IMM GRANULOCYTES NFR BLD AUTO: 0 % (ref 0–5)
LYMPHOCYTES # BLD: 3 K/UL (ref 0.5–4.6)
LYMPHOCYTES NFR BLD: 26 % (ref 13–44)
MCH RBC QN AUTO: 26.7 PG (ref 26.1–32.9)
MCHC RBC AUTO-ENTMCNC: 32.6 G/DL (ref 31.4–35)
MCV RBC AUTO: 81.9 FL (ref 82–102)
MONOCYTES # BLD: 0.9 K/UL (ref 0.1–1.3)
MONOCYTES NFR BLD: 8 % (ref 4–12)
NEUTS SEG # BLD: 7.6 K/UL (ref 1.7–8.2)
NEUTS SEG NFR BLD: 64 % (ref 43–78)
NRBC # BLD: 0 K/UL (ref 0–0.2)
PLATELET # BLD AUTO: 314 K/UL (ref 150–450)
PMV BLD AUTO: 8.9 FL (ref 9.4–12.3)
POTASSIUM SERPL-SCNC: 3.7 MMOL/L (ref 3.5–5.1)
PROT SERPL-MCNC: 6.6 G/DL (ref 6.3–8.2)
RBC # BLD AUTO: 4.2 M/UL (ref 4.23–5.6)
SERVICE CMNT-IMP: NORMAL
SODIUM SERPL-SCNC: 138 MMOL/L (ref 136–145)
WBC # BLD AUTO: 11.8 K/UL (ref 4.3–11.1)

## 2024-08-14 PROCEDURE — 6370000000 HC RX 637 (ALT 250 FOR IP): Performed by: STUDENT IN AN ORGANIZED HEALTH CARE EDUCATION/TRAINING PROGRAM

## 2024-08-14 PROCEDURE — 80053 COMPREHEN METABOLIC PANEL: CPT

## 2024-08-14 PROCEDURE — 6370000000 HC RX 637 (ALT 250 FOR IP): Performed by: INTERNAL MEDICINE

## 2024-08-14 PROCEDURE — 36415 COLL VENOUS BLD VENIPUNCTURE: CPT

## 2024-08-14 PROCEDURE — 1100000000 HC RM PRIVATE

## 2024-08-14 PROCEDURE — 6360000002 HC RX W HCPCS: Performed by: FAMILY MEDICINE

## 2024-08-14 PROCEDURE — 85025 COMPLETE CBC W/AUTO DIFF WBC: CPT

## 2024-08-14 PROCEDURE — 94660 CPAP INITIATION&MGMT: CPT

## 2024-08-14 PROCEDURE — 2580000003 HC RX 258: Performed by: FAMILY MEDICINE

## 2024-08-14 PROCEDURE — 6370000000 HC RX 637 (ALT 250 FOR IP): Performed by: FAMILY MEDICINE

## 2024-08-14 PROCEDURE — 97530 THERAPEUTIC ACTIVITIES: CPT

## 2024-08-14 RX ORDER — IBUPROFEN 400 MG/1
400 TABLET, FILM COATED ORAL EVERY 6 HOURS PRN
Status: DISCONTINUED | OUTPATIENT
Start: 2024-08-14 | End: 2024-08-15 | Stop reason: HOSPADM

## 2024-08-14 RX ADMIN — NEOMYCIN SULFATE, POLYMYXIN B SULFATE AND DEXAMETHASONE 2 DROP: 3.5; 10000; 1 SUSPENSION/ DROPS OPHTHALMIC at 05:53

## 2024-08-14 RX ADMIN — SACUBITRIL AND VALSARTAN 1 TABLET: 49; 51 TABLET, FILM COATED ORAL at 21:05

## 2024-08-14 RX ADMIN — NEOMYCIN SULFATE, POLYMYXIN B SULFATE AND DEXAMETHASONE 2 DROP: 3.5; 10000; 1 SUSPENSION/ DROPS OPHTHALMIC at 18:10

## 2024-08-14 RX ADMIN — ENOXAPARIN SODIUM 30 MG: 100 INJECTION SUBCUTANEOUS at 21:05

## 2024-08-14 RX ADMIN — OXYCODONE 5 MG: 5 TABLET ORAL at 18:09

## 2024-08-14 RX ADMIN — PANTOPRAZOLE SODIUM 40 MG: 40 TABLET, DELAYED RELEASE ORAL at 05:51

## 2024-08-14 RX ADMIN — AMOXICILLIN AND CLAVULANATE POTASSIUM 1 TABLET: 875; 125 TABLET, FILM COATED ORAL at 21:05

## 2024-08-14 RX ADMIN — ENOXAPARIN SODIUM 30 MG: 100 INJECTION SUBCUTANEOUS at 09:09

## 2024-08-14 RX ADMIN — SODIUM CHLORIDE, PRESERVATIVE FREE 10 ML: 5 INJECTION INTRAVENOUS at 21:05

## 2024-08-14 RX ADMIN — OXYCODONE 5 MG: 5 TABLET ORAL at 23:30

## 2024-08-14 RX ADMIN — SACUBITRIL AND VALSARTAN 1 TABLET: 49; 51 TABLET, FILM COATED ORAL at 09:09

## 2024-08-14 RX ADMIN — NEOMYCIN SULFATE, POLYMYXIN B SULFATE AND DEXAMETHASONE 2 DROP: 3.5; 10000; 1 SUSPENSION/ DROPS OPHTHALMIC at 00:22

## 2024-08-14 RX ADMIN — NEOMYCIN SULFATE, POLYMYXIN B SULFATE AND DEXAMETHASONE 2 DROP: 3.5; 10000; 1 SUSPENSION/ DROPS OPHTHALMIC at 23:31

## 2024-08-14 RX ADMIN — AMOXICILLIN AND CLAVULANATE POTASSIUM 1 TABLET: 875; 125 TABLET, FILM COATED ORAL at 09:09

## 2024-08-14 RX ADMIN — TAMSULOSIN HYDROCHLORIDE 0.8 MG: 0.4 CAPSULE ORAL at 09:09

## 2024-08-14 RX ADMIN — NEOMYCIN SULFATE, POLYMYXIN B SULFATE AND DEXAMETHASONE 2 DROP: 3.5; 10000; 1 SUSPENSION/ DROPS OPHTHALMIC at 12:44

## 2024-08-14 RX ADMIN — SODIUM CHLORIDE, PRESERVATIVE FREE 10 ML: 5 INJECTION INTRAVENOUS at 00:22

## 2024-08-14 RX ADMIN — ATORVASTATIN CALCIUM 80 MG: 40 TABLET, FILM COATED ORAL at 09:09

## 2024-08-14 RX ADMIN — SODIUM CHLORIDE, PRESERVATIVE FREE 10 ML: 5 INJECTION INTRAVENOUS at 09:10

## 2024-08-14 RX ADMIN — IBUPROFEN 400 MG: 400 TABLET, FILM COATED ORAL at 09:17

## 2024-08-14 ASSESSMENT — PAIN DESCRIPTION - LOCATION
LOCATION: KNEE

## 2024-08-14 ASSESSMENT — PAIN SCALES - GENERAL
PAINLEVEL_OUTOF10: 2
PAINLEVEL_OUTOF10: 5
PAINLEVEL_OUTOF10: 5
PAINLEVEL_OUTOF10: 6
PAINLEVEL_OUTOF10: 3

## 2024-08-14 ASSESSMENT — PAIN DESCRIPTION - DESCRIPTORS
DESCRIPTORS: DISCOMFORT
DESCRIPTORS: DISCOMFORT

## 2024-08-14 ASSESSMENT — PAIN DESCRIPTION - ORIENTATION
ORIENTATION: RIGHT
ORIENTATION: RIGHT

## 2024-08-14 NOTE — PROGRESS NOTES
ACUTE PHYSICAL THERAPY GOALS:   (Developed with and agreed upon by patient and/or caregiver.)  GOALS (1-4 days):  (1.)Mr. Lane will move from supine to sit and sit to supine  in bed with STAND BY ASSIST.  (2.)Mr. Lane will transfer from bed to chair and chair to bed with STAND BY ASSIST using the least restrictive device.  (3.)Mr. Lane will ambulate with STAND BY ASSIST for 100 feet with the least restrictive device.  (4.)Mr. Lane will ambulate up/down 6 steps with bilateral  railing with MINIMAL ASSIST.  (5.)Mr. Lane will increase right knee ROM to -5 to 90°.  ________________________________________________________________________________________________            PHYSICAL THERAPY Daily Note and AM  (Link to Caseload Tracking: PT Visit Days : 2  Acknowledge Orders  Time In/Out  PT Charge Capture  Rehab Caseload Tracker    Teddy Lane is a 69 y.o. male   PRIMARY DIAGNOSIS: Acute pain of right knee  Other specified hypotension [I95.89]  Hypotension [I95.9]  Septicemia (HCC) [A41.9]  Right ear pain [H92.01]  New daily persistent headache [G44.52]  Effusion of right knee [M25.461]       Reason for Referral: Other abnormalities of gait and mobility (R26.89)  Inpatient: Payor: HU[employer] MEDICARE / Plan: [employer] GOLD PLUS HMO / Product Type: *No Product type* /     ASSESSMENT:     REHAB RECOMMENDATIONS:   Recommendation to date pending progress:  Setting:  Home Health Therapy    Equipment:     Needs a RW     ASSESSMENT:  Mr. Lane presents with decreased with functional mobility and decreased rom and strength of right LE due to right knee pain.  He lives alone and is normally independent.  He has a cane.  He drives and works part time.  This afternoon, he reports continued right knee pain. It is swollen and warm to touch.  He has limited rom of the knee due to pain and strength <3/5 of the right knee due to pain.  Min assist to sit up and to stand with RW.  He ambulated in room with RW CGA with antalgic gait  Assistance, CGA=Contact Guard Assistance,   Min=Minimal Assistance, Mod=Moderate Assistance, Max=Maximal Assistance, Total=Total Assistance, NT=Not Tested    PLAN:   FREQUENCY AND DURATION: Daily for duration of hospital stay or until stated goals are met, whichever comes first.    THERAPY PROGNOSIS: Good    PROBLEM LIST:   (Skilled intervention is medically necessary to address:)  Decreased ADL/Functional Activities  Decreased Activity Tolerance  Decreased AROM/PROM  Decreased Balance  Decreased Coordination  Decreased Gait Ability  Decreased Safety Awareness  Decreased Strength  Decreased Transfer Abilities  Increased Pain INTERVENTIONS PLANNED:   (Benefits and precautions of physical therapy have been discussed with the patient.)  Therapeutic Activity  Therapeutic Exercise/HEP  Gait Training  Education       TREATMENT:       TREATMENT:   Therapeutic Activity (25 Minutes): Therapeutic activity included Supine to Sit, Scooting, Transfer Training, Ambulation on level ground, Sitting balance , Standing balance, and exercises to improve functional Activity tolerance, Balance, Coordination, Mobility, Strength, and ROM.    TREATMENT GRID:   Date:  8/14 Date:   Date:     Activity/Exercise Parameters Parameters Parameters   Ankle pump 10     Quad set 10     Glut set 10     Hip abduction 10     Heel slides 10                     AFTER TREATMENT PRECAUTIONS: Bed/Chair Locked, Call light within reach, Chair, Needs within reach, and RN notified    INTERDISCIPLINARY COLLABORATION:  RN/ PCT    EDUCATION:      TIME IN/OUT:  Time In: 1100  Time Out: 1125  Minutes: 25    ALEKSANDER NUÑEZ, PT

## 2024-08-14 NOTE — CARE COORDINATION
ROBERTO CARLOS CM met with pt at bedside to discuss discharge planning. SW discussed PT/OT recommendations of HH an DME of RW. SW provided pt with HH choice list with quality metrics. Pt open to HH at discharge with no preference. Pt open to DME of RW.     ROBERTO CARLOS sent referral to [facility] by [facility] for PT/OT/RN services. Awaiting review.    ROBERTO CARLOS sent referral to [facility] for DME of RW, requesting delivery of RW tomorrow, 8/15. Awaiting review.    CM team to continue to follow for any needs that may arise.

## 2024-08-14 NOTE — CARE COORDINATION
Pt chart reviewed and discussed in AM IDR for continued stay. LOS 3 days. Pt admitted with acute pain of right knee. Per MD, ortho to see today with anticipated discharge home tomorrow.     PT/OT recommending HH and DME of RW at discharge.    SW CM to discuss recommendations and provide pt with choice list with quality metrics for HH.    CM team will continue to follow for potential discharge needs that may arise.

## 2024-08-14 NOTE — PROGRESS NOTES
Hospitalist Progress Note   Admit Date:  2024  6:23 PM   Name:  Teddy Lane   Age:  69 y.o.  Sex:  male  :  1955   MRN:  [id number]   Room:  [facility]/    Presenting/Chief Complaint: Knee Injury and Otalgia     Reason(s) for Admission: Other specified hypotension [I95.89]  Hypotension [I95.9]  Septicemia (HCC) [A41.9]  Right ear pain [H92.01]  New daily persistent headache [G44.52]  Effusion of right knee [M25.461]     Hospital Course:   69 y.o. male with HTN, DM2, HLD, EDGARDO on CPAP, Crohn's disease, and COVID infection approximately 6 weeks prior to presentation presented to the ER on  with persistent right knee pain and swelling, right inner ear pain, and posterior scalp pain.  He was found to have a leukocytosis.  His right knee was aspirated and he was given vancomycin + Zosyn in the ER.   Patient presented with acute pain of right knee.  Discussed with orthopedics who suspected pseudogout.  Aspirated fluid with 20,000 WBC which is not elevated to the point of septic joint.  Leukocytosis did resolve with antibiotics.  Patient will continue Augmentin to finish course.  Patient will also follow-up with orthopedics in outpatient setting.  Patient has been having some acute onset ear pain to the right ear.  Patient states been going on for greater than 6 weeks since he had COVID and sinusitis.  Started on antibiotics/steroid drops.  CT head without any inner ear abnormality.  Continue Augmentin    Subjective & 24hr Events:   Patient was examined at the bedside.  No overnight events.  Patient still with complaints of right knee pain.  Ortho to reevaluate patient.      Assessment & Plan:   Acute pain of right knee  Discussed with orthopedics, who suspect pseudogout  Aspirated fluid with 20,000 WBC, which is not elevated to the point of septic joint  Crystal results still pending  Leukocytosis did resolve with antibiotics, so will start Augmentin in the meantime  Appreciate orthopedics input and

## 2024-08-15 VITALS
SYSTOLIC BLOOD PRESSURE: 129 MMHG | WEIGHT: 290 LBS | DIASTOLIC BLOOD PRESSURE: 80 MMHG | TEMPERATURE: 99 F | OXYGEN SATURATION: 92 % | BODY MASS INDEX: 43.95 KG/M2 | HEART RATE: 88 BPM | HEIGHT: 68 IN | RESPIRATION RATE: 16 BRPM

## 2024-08-15 LAB
BASOPHILS # BLD: 0.1 K/UL (ref 0–0.2)
BASOPHILS NFR BLD: 1 % (ref 0–2)
DIFFERENTIAL METHOD BLD: ABNORMAL
EOSINOPHIL # BLD: 0.1 K/UL (ref 0–0.8)
EOSINOPHIL NFR BLD: 1 % (ref 0.5–7.8)
ERYTHROCYTE [DISTWIDTH] IN BLOOD BY AUTOMATED COUNT: 17.9 % (ref 11.9–14.6)
HCT VFR BLD AUTO: 36.3 % (ref 41.1–50.3)
HGB BLD-MCNC: 11.8 G/DL (ref 13.6–17.2)
IMM GRANULOCYTES # BLD AUTO: 0 K/UL (ref 0–0.5)
IMM GRANULOCYTES NFR BLD AUTO: 0 % (ref 0–5)
LYMPHOCYTES # BLD: 2.5 K/UL (ref 0.5–4.6)
LYMPHOCYTES NFR BLD: 18 % (ref 13–44)
MCH RBC QN AUTO: 26.6 PG (ref 26.1–32.9)
MCHC RBC AUTO-ENTMCNC: 32.5 G/DL (ref 31.4–35)
MCV RBC AUTO: 81.9 FL (ref 82–102)
MONOCYTES # BLD: 0.8 K/UL (ref 0.1–1.3)
MONOCYTES NFR BLD: 6 % (ref 4–12)
NEUTS SEG # BLD: 9.8 K/UL (ref 1.7–8.2)
NEUTS SEG NFR BLD: 74 % (ref 43–78)
NRBC # BLD: 0 K/UL (ref 0–0.2)
PLATELET # BLD AUTO: 317 K/UL (ref 150–450)
PMV BLD AUTO: 9.1 FL (ref 9.4–12.3)
RBC # BLD AUTO: 4.43 M/UL (ref 4.23–5.6)
WBC # BLD AUTO: 13.4 K/UL (ref 4.3–11.1)

## 2024-08-15 PROCEDURE — 6370000000 HC RX 637 (ALT 250 FOR IP): Performed by: STUDENT IN AN ORGANIZED HEALTH CARE EDUCATION/TRAINING PROGRAM

## 2024-08-15 PROCEDURE — 6370000000 HC RX 637 (ALT 250 FOR IP): Performed by: INTERNAL MEDICINE

## 2024-08-15 PROCEDURE — 2580000003 HC RX 258: Performed by: FAMILY MEDICINE

## 2024-08-15 PROCEDURE — 36415 COLL VENOUS BLD VENIPUNCTURE: CPT

## 2024-08-15 PROCEDURE — 6370000000 HC RX 637 (ALT 250 FOR IP): Performed by: FAMILY MEDICINE

## 2024-08-15 PROCEDURE — 85025 COMPLETE CBC W/AUTO DIFF WBC: CPT

## 2024-08-15 PROCEDURE — 94760 N-INVAS EAR/PLS OXIMETRY 1: CPT

## 2024-08-15 PROCEDURE — 6360000002 HC RX W HCPCS: Performed by: FAMILY MEDICINE

## 2024-08-15 RX ORDER — IBUPROFEN 400 MG/1
400 TABLET, FILM COATED ORAL EVERY 6 HOURS PRN
Qty: 28 TABLET | Refills: 0 | Status: SHIPPED | OUTPATIENT
Start: 2024-08-15 | End: 2024-08-22

## 2024-08-15 RX ORDER — OXYCODONE HYDROCHLORIDE 5 MG/1
5 TABLET ORAL EVERY 6 HOURS PRN
Qty: 20 TABLET | Refills: 0 | Status: SHIPPED | OUTPATIENT
Start: 2024-08-15 | End: 2024-08-20

## 2024-08-15 RX ADMIN — NEOMYCIN SULFATE, POLYMYXIN B SULFATE AND DEXAMETHASONE 2 DROP: 3.5; 10000; 1 SUSPENSION/ DROPS OPHTHALMIC at 11:57

## 2024-08-15 RX ADMIN — OXYCODONE 5 MG: 5 TABLET ORAL at 05:32

## 2024-08-15 RX ADMIN — TAMSULOSIN HYDROCHLORIDE 0.8 MG: 0.4 CAPSULE ORAL at 08:36

## 2024-08-15 RX ADMIN — ATORVASTATIN CALCIUM 80 MG: 40 TABLET, FILM COATED ORAL at 08:36

## 2024-08-15 RX ADMIN — SACUBITRIL AND VALSARTAN 1 TABLET: 49; 51 TABLET, FILM COATED ORAL at 08:36

## 2024-08-15 RX ADMIN — PANTOPRAZOLE SODIUM 40 MG: 40 TABLET, DELAYED RELEASE ORAL at 05:32

## 2024-08-15 RX ADMIN — AMOXICILLIN AND CLAVULANATE POTASSIUM 1 TABLET: 875; 125 TABLET, FILM COATED ORAL at 08:36

## 2024-08-15 RX ADMIN — SODIUM CHLORIDE, PRESERVATIVE FREE 10 ML: 5 INJECTION INTRAVENOUS at 08:41

## 2024-08-15 RX ADMIN — ENOXAPARIN SODIUM 30 MG: 100 INJECTION SUBCUTANEOUS at 08:37

## 2024-08-15 RX ADMIN — NEOMYCIN SULFATE, POLYMYXIN B SULFATE AND DEXAMETHASONE 2 DROP: 3.5; 10000; 1 SUSPENSION/ DROPS OPHTHALMIC at 06:28

## 2024-08-15 ASSESSMENT — PAIN SCALES - GENERAL: PAINLEVEL_OUTOF10: 0

## 2024-08-15 NOTE — DISCHARGE SUMMARY
Hospitalist Discharge Summary   Admit Date:  2024  6:23 PM   DC Note date: 8/15/2024  Name:  Teddy Lane   Age:  69 y.o.  Sex:  male  :  1955   MRN:  [id number]   Room:  [facility]  PCP:  Not, On File (Inactive)    Presenting Complaint: Knee Injury and Otalgia     Initial Admission Diagnosis: Other specified hypotension [I95.89]  Hypotension [I95.9]  Septicemia (HCC) [A41.9]  Right ear pain [H92.01]  New daily persistent headache [G44.52]  Effusion of right knee [M25.461]     Problem List for this Hospitalization (present on admission):    Principal Problem:    Acute pain of right knee  Active Problems:    Normocytic anemia    Acute pain of right ear    EDGARDO on CPAP    Type 2 diabetes mellitus (HCC)    HTN (hypertension)    Crohn's disease (HCC)    Iron deficiency anemia  Resolved Problems:    Hypotension    Leukocytosis      Hospital Course:  69 y.o. male with HTN, DM2, HLD, EDGARDO on CPAP, Crohn's disease, and COVID infection approximately 6 weeks prior to presentation presented to the ER on  with persistent right knee pain and swelling, right inner ear pain, and posterior scalp pain.  He was found to have a leukocytosis.  His right knee was aspirated and he was given vancomycin + Zosyn in the ER.   Patient presented with acute pain of right knee.  Discussed with orthopedics who suspected pseudogout.  Aspirated fluid with 20,000 WBC which is not elevated to the point of septic joint.  Leukocytosis did resolve with antibiotics.  Patient will continue Augmentin to finish course.  Patient will also follow-up with orthopedics in outpatient setting.  Patient has been having some acute onset ear pain to the right ear.  Patient states been going on for greater than 6 weeks since he had COVID and sinusitis.  Started on antibiotics/steroid drops.  CT head without any inner ear abnormality.  Continue Augmentin.    Patient presented with acute pain of his right knee.  Discussed with orthopedics who suspected  pseudogout.  Aspirated fluid of 20,000 WBC which is not elevated to the point of septic joint.  Leukocytosis did resolve with antibiotics.  Patient was continued on Augmentin.  Patient also started on NSAID for ongoing right knee pain.  Patient to be followed up with Ortho within 1 week of discharge.  Reached back out to Ortho 8/15 believe reactive arthritis as WBC was 13 without shift.  Patient also complaining of acute pain of right ear which has been going on for greater than 6 weeks since he had COVID and sinusitis.  Started antibiotic/steroid drops which she will continue.  For patient's other comorbid conditions he was restarted back on his home medications.  Patient was advised he needs to follow-up with his primary care provider within the next 3 to 5 days to discuss recent hospitalization and any changes made to his medications.  All questions answered..  Patient denied any cardiac chest pain, shortness of breath, Devang pain, fever/chills.  All questions answered.  Patient agree with plan as above.    Disposition: Home with Home Health  Diet: ADULT DIET; Regular  Code Status: Full Code    Follow Ups:  Follow-up Information       Follow up With Specialties Details Why Contact Info    Cesar Wagner PA Orthopedic Surgery, Physician Assistant Schedule an appointment as soon as possible for a visit in 1 week(s)  [address] [place] [address]  [address]  [facility] [address]  [phone]      Not, On File  Follow up in 3 day(s)      [facility]   [address] [facility] [address]  [phone]    Not, On File  Follow up in 3 day(s)                  Time spent in patient discharge and coordination 48 minutes.      Follow up labs/diagnostics (ultimately defer to outpatient provider):  Defer to Follow-up Provider    Plan was discussed with Patient.  All questions answered.  Patient was stable at time of discharge.  Instructions given to call a  Source NASAL SWAB        SARS-CoV-2, Rapid Not detected        Comment:    The specimen is NEGATIVE for SARS-CoV-2, the novel coronavirus associated with COVID-19.  A negative result does not rule out COVID-19.     This test has been authorized by the FDA under an Emergency Use Authorization (EUA) for use by authorized laboratories.      Fact sheet for Healthcare Providers: https://www.fda.gov/media/151718/download  Fact sheet for Patients: https://www.fda.gov/media/825333/download     Methodology: Isothermal Nucleic Acid Amplification         Influenza A/B, Molecular [[id number]] Collected: 08/11/24 1925    Order Status: Completed Specimen: Nasopharyngeal Updated: 08/11/24 2018     Influenza A, LEROY Not detected        Comment: Negative results do not preclude infection with influenza virus and should not be the sole basis of a patient treatment decision.        Influenza B, LEROY Not detected       Group A Strep Screen By PCR [[id number]] Collected: 08/11/24 1925    Order Status: Completed Specimen: Throat Updated: 08/11/24 2018     Strep, Molecular Not detected        Comment: Negative for Strep A nucleic acid  Methodology: Isothermal Nucleic Acid Amplification         Blood Culture 2 [[id number]] Collected: 08/11/24 1845    Order Status: Completed Specimen: Blood Updated: 08/15/24 0714     Special Requests --        NO SPECIAL REQUESTS  LEFT  Antecubital       Culture NO GROWTH 4 DAYS       Blood Culture 1 [[id number]] Collected: 08/11/24 1834    Order Status: Completed Specimen: Blood Updated: 08/15/24 0714     Special Requests --        NO SPECIAL REQUESTS  RIGHT  Antecubital       Culture NO GROWTH 4 DAYS               All Labs from Last 24 Hrs:  Recent Results (from the past 24 hour(s))   CBC with Auto Differential    Collection Time: 08/15/24  8:38 AM   Result Value Ref Range    WBC 13.4 (H) 4.3 - 11.1 K/uL    RBC 4.43 4.23 - 5.6 M/uL    Hemoglobin 11.8 (L) 13.6 - 17.2 g/dL    Hematocrit 36.3 (L) 41.1 - 50.3 %  nontender, nondistended.    Extremities: Warm and dry.   No edema.    Skin:     No rashes.  Normal coloration  Neuro:  CN II-XII grossly intact.  Psych:  Normal mood and affect.      Signed:  Ned Correa MD    Part of this note may have been written by using a voice dictation software.  The note has been proof read but may still contain some grammatical/other typographical errors.

## 2024-08-15 NOTE — PLAN OF CARE
Problem: Discharge Planning  Goal: Discharge to home or other facility with appropriate resources  8/14/2024 2300 by Margot Peraza RN  Outcome: Progressing  8/14/2024 2258 by Margot Peraza RN  Outcome: Progressing     Problem: Pain  Goal: Verbalizes/displays adequate comfort level or baseline comfort level  8/14/2024 2300 by Margot Peraza RN  Outcome: Progressing  8/14/2024 2258 by Margot Peraza RN  Outcome: Progressing     Problem: Safety - Adult  Goal: Free from fall injury  8/14/2024 2300 by Margot Peraza RN  Outcome: Progressing  8/14/2024 2258 by Margot Peraza RN  Outcome: Progressing

## 2024-08-15 NOTE — PROGRESS NOTES
Physical Therapy Note:    Attempted to see patient this PM for physical therapy treatment  session. Patient is supine on contact, had his walker delivered to room. Pt reports that he got up earlier to the bathroom and moved fine. He politely declines therapy stating he will be moving plenty when he gets home. Will follow and re-attempt as schedule permits/patient available if pt does not discharge. Thank you,    ARJUN MCKEON, PT     Rehab Caseload Tracker

## 2024-08-15 NOTE — CARE COORDINATION
Pt is for discharge home today via personal vehicle with [facility] PT/OT/RN services and DME of  provided by [email or web address]          08/15/24 0449   Service Assessment   Patient's Healthcare Decision Maker is: Legal Next of Kin   Social/Functional History   Lives With Alone   Type of Home House   Home Layout One level   Home Access Stairs to enter with rails   Entrance Stairs - Number of Steps 6   Entrance Stairs - Rails Both   Bathroom Equipment None   Home Equipment Cane   Receives Help From Family   ADL Assistance Independent   Homemaking Assistance Independent   Homemaking Responsibilities Yes   Ambulation Assistance Independent   Transfer Assistance Independent   Active  Yes   Mode of Transportation Car   Occupation Unemployed   Services At/After Discharge   Transition of Care Consult (CM Consult) Discharge Planning;Home Health;DME/Supply Assistance   Internal Home Health Yes   Services At/After Discharge DME;PT;OT;Nursing services;Home Health  ([facility]; [employer] [facility] of [facility])   Dante Resource Information Provided? No   Mode of Transport at Discharge Self   Confirm Follow Up Transport Self   Condition of Participation: Discharge Planning   The Plan for Transition of Care is related to the following treatment goals: Pt to return home via personal vehicle with  PT/OT/RN services and DME of [facility].   The Patient and/or Patient Representative was provided with a Choice of Provider? Patient   The Patient and/Or Patient Representative agree with the Discharge Plan? Yes   Freedom of Choice list was provided with basic dialogue that supports the patient's individualized plan of care/goals, treatment preferences, and shares the quality data associated with the providers?  Yes     Tania Pal, MSW, LBSW    [facility]

## 2025-03-16 ENCOUNTER — APPOINTMENT (OUTPATIENT)
Dept: GENERAL RADIOLOGY | Age: 70
End: 2025-03-16
Payer: MEDICARE

## 2025-03-16 ENCOUNTER — HOSPITAL ENCOUNTER (EMERGENCY)
Age: 70
Discharge: HOME | End: 2025-03-16
Payer: MEDICARE

## 2025-03-16 ENCOUNTER — APPOINTMENT (OUTPATIENT)
Dept: CT IMAGING | Age: 70
End: 2025-03-16
Payer: MEDICARE

## 2025-03-16 VITALS
SYSTOLIC BLOOD PRESSURE: 164 MMHG | HEART RATE: 74 BPM | TEMPERATURE: 97.4 F | WEIGHT: 315 LBS | DIASTOLIC BLOOD PRESSURE: 97 MMHG | RESPIRATION RATE: 18 BRPM | OXYGEN SATURATION: 95 % | HEIGHT: 68 IN | BODY MASS INDEX: 47.74 KG/M2

## 2025-03-16 DIAGNOSIS — K59.00 CONSTIPATION, UNSPECIFIED CONSTIPATION TYPE: Primary | ICD-10-CM

## 2025-03-16 LAB
ALBUMIN SERPL-MCNC: 3.6 G/DL (ref 3.2–4.6)
ALBUMIN/GLOB SERPL: 0.8 (ref 1–1.9)
ALP SERPL-CCNC: 76 U/L (ref 40–129)
ALT SERPL-CCNC: 79 U/L (ref 8–55)
ANION GAP SERPL CALC-SCNC: 11 MMOL/L (ref 7–16)
APPEARANCE UR: CLEAR
AST SERPL-CCNC: 46 U/L (ref 15–37)
BACTERIA URNS QL MICRO: NEGATIVE /HPF
BASOPHILS # BLD: 0.03 K/UL (ref 0–0.2)
BASOPHILS NFR BLD: 0.4 % (ref 0–2)
BILIRUB SERPL-MCNC: 0.3 MG/DL (ref 0–1.2)
BILIRUB UR QL: NEGATIVE
BUN SERPL-MCNC: 13 MG/DL (ref 8–23)
CALCIUM SERPL-MCNC: 9.2 MG/DL (ref 8.8–10.2)
CHLORIDE SERPL-SCNC: 103 MMOL/L (ref 98–107)
CO2 SERPL-SCNC: 25 MMOL/L (ref 20–29)
COLOR UR: ABNORMAL
CREAT SERPL-MCNC: 1.32 MG/DL (ref 0.8–1.3)
DIFFERENTIAL METHOD BLD: ABNORMAL
EOSINOPHIL # BLD: 0.06 K/UL (ref 0–0.8)
EOSINOPHIL NFR BLD: 0.8 % (ref 0.5–7.8)
EPI CELLS #/AREA URNS HPF: ABNORMAL /HPF
ERYTHROCYTE [DISTWIDTH] IN BLOOD BY AUTOMATED COUNT: 16.9 % (ref 11.9–14.6)
GLOBULIN SER CALC-MCNC: 4.4 G/DL (ref 2.3–3.5)
GLUCOSE SERPL-MCNC: 96 MG/DL (ref 70–99)
GLUCOSE UR STRIP.AUTO-MCNC: NEGATIVE MG/DL
HCT VFR BLD AUTO: 39.6 % (ref 41.1–50.3)
HGB BLD-MCNC: 12.7 G/DL (ref 13.6–17.2)
HGB UR QL STRIP: ABNORMAL
HYALINE CASTS URNS QL MICRO: ABNORMAL /LPF
IMM GRANULOCYTES # BLD AUTO: 0.02 K/UL (ref 0–0.5)
IMM GRANULOCYTES NFR BLD AUTO: 0.3 % (ref 0–5)
KETONES UR QL STRIP.AUTO: ABNORMAL MG/DL
LACTATE SERPL-SCNC: 0.8 MMOL/L (ref 0.5–2)
LEUKOCYTE ESTERASE UR QL STRIP.AUTO: ABNORMAL
LIPASE SERPL-CCNC: 25 U/L (ref 13–60)
LYMPHOCYTES # BLD: 1.66 K/UL (ref 0.5–4.6)
LYMPHOCYTES NFR BLD: 21.1 % (ref 13–44)
MCH RBC QN AUTO: 27.9 PG (ref 26.1–32.9)
MCHC RBC AUTO-ENTMCNC: 32.1 G/DL (ref 31.4–35)
MCV RBC AUTO: 86.8 FL (ref 82–102)
MONOCYTES # BLD: 0.51 K/UL (ref 0.1–1.3)
MONOCYTES NFR BLD: 6.5 % (ref 4–12)
NEUTS SEG # BLD: 5.58 K/UL (ref 1.7–8.2)
NEUTS SEG NFR BLD: 70.9 % (ref 43–78)
NITRITE UR QL STRIP.AUTO: NEGATIVE
NRBC # BLD: 0 K/UL (ref 0–0.2)
PH UR STRIP: 5 (ref 5–9)
PLATELET # BLD AUTO: 241 K/UL (ref 150–450)
PMV BLD AUTO: 8.9 FL (ref 9.4–12.3)
POTASSIUM SERPL-SCNC: 3.8 MMOL/L (ref 3.5–5.1)
PROT SERPL-MCNC: 8 G/DL (ref 6.3–8.2)
PROT UR STRIP-MCNC: ABNORMAL MG/DL
RBC # BLD AUTO: 4.56 M/UL (ref 4.23–5.6)
RBC #/AREA URNS HPF: ABNORMAL /HPF
SODIUM SERPL-SCNC: 139 MMOL/L (ref 136–145)
SP GR UR REFRACTOMETRY: 1.03 (ref 1–1.02)
UROBILINOGEN UR QL STRIP.AUTO: 0.2 EU/DL (ref 0.2–1)
WBC # BLD AUTO: 7.9 K/UL (ref 4.3–11.1)
WBC URNS QL MICRO: ABNORMAL /HPF

## 2025-03-16 PROCEDURE — 6360000004 HC RX CONTRAST MEDICATION

## 2025-03-16 PROCEDURE — 83605 ASSAY OF LACTIC ACID: CPT

## 2025-03-16 PROCEDURE — 83690 ASSAY OF LIPASE: CPT

## 2025-03-16 PROCEDURE — 74177 CT ABD & PELVIS W/CONTRAST: CPT

## 2025-03-16 PROCEDURE — 6360000002 HC RX W HCPCS

## 2025-03-16 PROCEDURE — 80053 COMPREHEN METABOLIC PANEL: CPT

## 2025-03-16 PROCEDURE — 96374 THER/PROPH/DIAG INJ IV PUSH: CPT

## 2025-03-16 PROCEDURE — 99285 EMERGENCY DEPT VISIT HI MDM: CPT

## 2025-03-16 PROCEDURE — 74022 RADEX COMPL AQT ABD SERIES: CPT

## 2025-03-16 PROCEDURE — 81001 URINALYSIS AUTO W/SCOPE: CPT

## 2025-03-16 PROCEDURE — 85025 COMPLETE CBC W/AUTO DIFF WBC: CPT

## 2025-03-16 RX ORDER — IOPAMIDOL 755 MG/ML
100 INJECTION, SOLUTION INTRAVASCULAR
Status: COMPLETED | OUTPATIENT
Start: 2025-03-16 | End: 2025-03-16

## 2025-03-16 RX ORDER — ONDANSETRON 2 MG/ML
4 INJECTION INTRAMUSCULAR; INTRAVENOUS ONCE
Status: COMPLETED | OUTPATIENT
Start: 2025-03-16 | End: 2025-03-16

## 2025-03-16 RX ORDER — 0.9 % SODIUM CHLORIDE 0.9 %
1000 INTRAVENOUS SOLUTION INTRAVENOUS ONCE
Status: DISCONTINUED | OUTPATIENT
Start: 2025-03-16 | End: 2025-03-16

## 2025-03-16 RX ADMIN — IOPAMIDOL 100 ML: 755 INJECTION, SOLUTION INTRAVENOUS at 13:25

## 2025-03-16 RX ADMIN — ONDANSETRON 4 MG: 2 INJECTION, SOLUTION INTRAMUSCULAR; INTRAVENOUS at 12:26

## 2025-03-16 ASSESSMENT — PAIN DESCRIPTION - FREQUENCY: FREQUENCY: CONTINUOUS

## 2025-03-16 ASSESSMENT — PAIN - FUNCTIONAL ASSESSMENT: PAIN_FUNCTIONAL_ASSESSMENT: 0-10

## 2025-03-16 ASSESSMENT — PAIN SCALES - GENERAL
PAINLEVEL_OUTOF10: 6
PAINLEVEL_OUTOF10: 2

## 2025-03-16 ASSESSMENT — PAIN DESCRIPTION - ORIENTATION: ORIENTATION: LEFT

## 2025-03-16 ASSESSMENT — PAIN DESCRIPTION - DESCRIPTORS: DESCRIPTORS: CRAMPING

## 2025-03-16 ASSESSMENT — PAIN DESCRIPTION - LOCATION: LOCATION: ABDOMEN

## 2025-03-16 ASSESSMENT — PAIN DESCRIPTION - PAIN TYPE: TYPE: ACUTE PAIN

## 2025-03-16 NOTE — ED PROVIDER NOTES
g/dL    Albumin/Globulin Ratio 0.8 (L) 1.0 - 1.9     Lipase   Result Value Ref Range    Lipase 25 13 - 60 U/L   Urinalysis “IF” dysuria, frequency, or urgency.   Result Value Ref Range    Color, UA YELLOW/STRAW      Appearance CLEAR      Specific Gravity, UA 1.027 (H) 1.001 - 1.023      pH, Urine 5.0 5.0 - 9.0      Protein, UA TRACE (A) NEG mg/dL    Glucose, Ur Negative NEG mg/dL    Ketones, Urine TRACE (A) NEG mg/dL    Bilirubin, Urine Negative NEG      Blood, Urine SMALL (A) NEG      Urobilinogen, Urine 0.2 0.2 - 1.0 EU/dL    Nitrite, Urine Negative NEG      Leukocyte Esterase, Urine SMALL (A) NEG      WBC, UA 10-20 (A) U4 /hpf    RBC, UA 0-5 U5 /hpf    Epithelial Cells, UA 0-5 U5 /hpf    BACTERIA, URINE Negative NEG /hpf    Hyaline Casts, UA 0-2 /lpf   Lactate, Sepsis   Result Value Ref Range    Lactic Acid, Sepsis 0.8 0.5 - 2.0 MMOL/L         CT ABDOMEN PELVIS W IV CONTRAST Additional Contrast? None   Final Result   No acute findings within the abdomen or pelvis.      Noncontrast CT despite the reported administration of IV contrast. Perhaps the   injection failed or there was extravasation.         Electronically signed by Alexander Serrano      XR ACUTE ABD SERIES CHEST 1 VW   Final Result   1. Negative examination for acute or active pulmonary parenchymal disease.   2.Gasless abdomen. Intestinal obstruction cannot be excluded. Clinical   correlation is recommended with follow-up.         Electronically signed by Kris Neves                   No results for input(s): \"COVID19\" in the last 72 hours.     Voice dictation software was used during the making of this note.  This software is not perfect and grammatical and other typographical errors may be present.  This note has not been completely proofread for errors.     Jackie Vasquez PA-C  03/16/25 9738

## 2025-03-16 NOTE — ED TRIAGE NOTES
Patient to triage with c/o LLQ  abdominal with nausea and constipation for the past 3 days    Denies fever/chills     No abdominal sx

## 2025-03-16 NOTE — DISCHARGE INSTRUCTIONS
As we discussed, your workup today was normal.  I suspect that your abdominal pain is related to constipation.  Recommend continuing your fiber intake, you may also add a stool softener.  Continue to drink plenty of noncaffeinated fluids.  Follow-up with your GI doctor.

## 2025-09-01 ENCOUNTER — HOSPITAL ENCOUNTER (EMERGENCY)
Age: 70
Discharge: HOME | End: 2025-09-01
Attending: EMERGENCY MEDICINE
Payer: MEDICARE

## 2025-09-01 ENCOUNTER — APPOINTMENT (OUTPATIENT)
Dept: GENERAL RADIOLOGY | Age: 70
End: 2025-09-01
Payer: MEDICARE

## 2025-09-01 VITALS
HEART RATE: 88 BPM | RESPIRATION RATE: 18 BRPM | HEIGHT: 68 IN | OXYGEN SATURATION: 95 % | SYSTOLIC BLOOD PRESSURE: 121 MMHG | DIASTOLIC BLOOD PRESSURE: 76 MMHG | BODY MASS INDEX: 47.74 KG/M2 | TEMPERATURE: 98.5 F | WEIGHT: 315 LBS

## 2025-09-01 DIAGNOSIS — J18.9 COMMUNITY ACQUIRED PNEUMONIA OF LEFT UPPER LOBE OF LUNG: Primary | ICD-10-CM

## 2025-09-01 LAB
FLUAV RNA SPEC QL NAA+PROBE: NOT DETECTED
FLUBV RNA SPEC QL NAA+PROBE: NOT DETECTED
SARS-COV-2 RDRP RESP QL NAA+PROBE: NOT DETECTED
SOURCE: NORMAL

## 2025-09-01 PROCEDURE — 6370000000 HC RX 637 (ALT 250 FOR IP): Performed by: EMERGENCY MEDICINE

## 2025-09-01 PROCEDURE — 71046 X-RAY EXAM CHEST 2 VIEWS: CPT

## 2025-09-01 PROCEDURE — 99284 EMERGENCY DEPT VISIT MOD MDM: CPT

## 2025-09-01 PROCEDURE — 87636 SARSCOV2 & INF A&B AMP PRB: CPT

## 2025-09-01 RX ORDER — DOXYCYCLINE HYCLATE 100 MG
100 TABLET ORAL 2 TIMES DAILY
Qty: 14 TABLET | Refills: 0 | Status: SHIPPED | OUTPATIENT
Start: 2025-09-01 | End: 2025-09-08

## 2025-09-01 RX ORDER — DOXYCYCLINE 100 MG/1
100 CAPSULE ORAL
Status: COMPLETED | OUTPATIENT
Start: 2025-09-01 | End: 2025-09-01

## 2025-09-01 RX ADMIN — DOXYCYCLINE HYCLATE 100 MG: 100 CAPSULE ORAL at 06:30

## 2025-09-01 ASSESSMENT — PAIN SCALES - GENERAL
PAINLEVEL_OUTOF10: 5
PAINLEVEL_OUTOF10: 5

## 2025-09-01 ASSESSMENT — PAIN - FUNCTIONAL ASSESSMENT
PAIN_FUNCTIONAL_ASSESSMENT: 0-10
PAIN_FUNCTIONAL_ASSESSMENT: 0-10

## 2025-09-01 ASSESSMENT — PAIN DESCRIPTION - LOCATION: LOCATION: RIB CAGE

## 2025-09-05 ASSESSMENT — ENCOUNTER SYMPTOMS
SHORTNESS OF BREATH: 1
ABDOMINAL PAIN: 0
COLOR CHANGE: 0
COUGH: 1
VOMITING: 0
NAUSEA: 0
DIARRHEA: 0
EYE REDNESS: 0
RHINORRHEA: 0
FACIAL SWELLING: 0
BACK PAIN: 1
EYE DISCHARGE: 0